# Patient Record
Sex: FEMALE | Race: BLACK OR AFRICAN AMERICAN | Employment: PART TIME
[De-identification: names, ages, dates, MRNs, and addresses within clinical notes are randomized per-mention and may not be internally consistent; named-entity substitution may affect disease eponyms.]

---

## 2017-05-27 ENCOUNTER — HEALTH MAINTENANCE LETTER (OUTPATIENT)
Age: 32
End: 2017-05-27

## 2017-07-19 ENCOUNTER — NURSE TRIAGE (OUTPATIENT)
Dept: NURSING | Facility: CLINIC | Age: 32
End: 2017-07-19

## 2017-07-19 NOTE — TELEPHONE ENCOUNTER
"  Reason for Disposition    Lightheadedness or dizziness (e.g., feels like passing out)    Additional Information    Negative: MODERATE-SEVERE abdominal pain (e.g., interferes with normal activities, awakens from sleep)    Negative: [1] SEVERE vaginal bleeding (i.e., soaking 2 pads / hour, large blood clots) AND [2] present 2 or more hours    Negative: [1] MODERATE vaginal bleeding (i.e., soaking 1 pad / hour; clots) AND [2] present > 6 hours    Negative: [1] MODERATE vaginal bleeding (i.e., soaking 1 pad / hour; clots) AND [2] pregnant > 12 weeks    Negative: Passed tissue (e.g., gray-white)    Negative: [1] Vomiting AND [2] contains red blood or black (\"coffee ground\") material  (Exception: few red streaks in vomit that only happened once)    Negative: Shoulder pain    Protocols used: PREGNANCY - ABDOMINAL PAIN LESS THAN 20 WEEKS EGA-ADULT-    "

## 2017-07-20 ENCOUNTER — APPOINTMENT (OUTPATIENT)
Dept: ULTRASOUND IMAGING | Facility: CLINIC | Age: 32
End: 2017-07-20
Attending: EMERGENCY MEDICINE
Payer: COMMERCIAL

## 2017-07-20 ENCOUNTER — HOSPITAL ENCOUNTER (EMERGENCY)
Facility: CLINIC | Age: 32
Discharge: HOME | End: 2017-07-20
Attending: EMERGENCY MEDICINE | Admitting: EMERGENCY MEDICINE
Payer: COMMERCIAL

## 2017-07-20 VITALS
BODY MASS INDEX: 26.72 KG/M2 | WEIGHT: 180.4 LBS | HEIGHT: 69 IN | TEMPERATURE: 98.2 F | DIASTOLIC BLOOD PRESSURE: 68 MMHG | OXYGEN SATURATION: 100 % | RESPIRATION RATE: 18 BRPM | SYSTOLIC BLOOD PRESSURE: 107 MMHG

## 2017-07-20 DIAGNOSIS — R42 LIGHTHEADEDNESS: ICD-10-CM

## 2017-07-20 DIAGNOSIS — E16.2 HYPOGLYCEMIA: ICD-10-CM

## 2017-07-20 LAB
ABO + RH BLD: NORMAL
ABO + RH BLD: NORMAL
ALBUMIN UR-MCNC: NEGATIVE MG/DL
ANION GAP SERPL CALCULATED.3IONS-SCNC: 9 MMOL/L (ref 3–14)
APPEARANCE UR: CLEAR
B-HCG SERPL-ACNC: ABNORMAL IU/L (ref 0–5)
BASOPHILS # BLD AUTO: 0 10E9/L (ref 0–0.2)
BASOPHILS NFR BLD AUTO: 0.1 %
BILIRUB UR QL STRIP: NEGATIVE
BUN SERPL-MCNC: 9 MG/DL (ref 7–30)
CALCIUM SERPL-MCNC: 8.5 MG/DL (ref 8.5–10.1)
CHLORIDE SERPL-SCNC: 103 MMOL/L (ref 94–109)
CO2 SERPL-SCNC: 25 MMOL/L (ref 20–32)
COLOR UR AUTO: YELLOW
CREAT SERPL-MCNC: 0.69 MG/DL (ref 0.52–1.04)
DIFFERENTIAL METHOD BLD: ABNORMAL
EOSINOPHIL # BLD AUTO: 0.1 10E9/L (ref 0–0.7)
EOSINOPHIL NFR BLD AUTO: 0.7 %
ERYTHROCYTE [DISTWIDTH] IN BLOOD BY AUTOMATED COUNT: 12.8 % (ref 10–15)
GFR SERPL CREATININE-BSD FRML MDRD: ABNORMAL ML/MIN/1.7M2
GLUCOSE BLDC GLUCOMTR-MCNC: 148 MG/DL (ref 70–99)
GLUCOSE SERPL-MCNC: 57 MG/DL (ref 70–99)
GLUCOSE UR STRIP-MCNC: NEGATIVE MG/DL
HCT VFR BLD AUTO: 40.4 % (ref 35–47)
HGB BLD-MCNC: 14.2 G/DL (ref 11.7–15.7)
HGB UR QL STRIP: NEGATIVE
IMM GRANULOCYTES # BLD: 0 10E9/L (ref 0–0.4)
IMM GRANULOCYTES NFR BLD: 0.3 %
INTERPRETATION ECG - MUSE: NORMAL
KETONES UR STRIP-MCNC: NEGATIVE MG/DL
LEUKOCYTE ESTERASE UR QL STRIP: NEGATIVE
LYMPHOCYTES # BLD AUTO: 1.5 10E9/L (ref 0.8–5.3)
LYMPHOCYTES NFR BLD AUTO: 20.7 %
MCH RBC QN AUTO: 33.3 PG (ref 26.5–33)
MCHC RBC AUTO-ENTMCNC: 35.1 G/DL (ref 31.5–36.5)
MCV RBC AUTO: 95 FL (ref 78–100)
MONOCYTES # BLD AUTO: 0.6 10E9/L (ref 0–1.3)
MONOCYTES NFR BLD AUTO: 8.1 %
NEUTROPHILS # BLD AUTO: 5.2 10E9/L (ref 1.6–8.3)
NEUTROPHILS NFR BLD AUTO: 70.1 %
NITRATE UR QL: NEGATIVE
NRBC # BLD AUTO: 0 10*3/UL
NRBC BLD AUTO-RTO: 0 /100
PH UR STRIP: 6 PH (ref 5–7)
PLATELET # BLD AUTO: 294 10E9/L (ref 150–450)
POTASSIUM SERPL-SCNC: 3.5 MMOL/L (ref 3.4–5.3)
RBC # BLD AUTO: 4.27 10E12/L (ref 3.8–5.2)
SODIUM SERPL-SCNC: 137 MMOL/L (ref 133–144)
SP GR UR STRIP: 1.02 (ref 1–1.03)
SPECIMEN EXP DATE BLD: NORMAL
URN SPEC COLLECT METH UR: NORMAL
UROBILINOGEN UR STRIP-ACNC: 0.2 EU/DL (ref 0.2–1)
WBC # BLD AUTO: 7.4 10E9/L (ref 4–11)

## 2017-07-20 PROCEDURE — 96361 HYDRATE IV INFUSION ADD-ON: CPT

## 2017-07-20 PROCEDURE — 96365 THER/PROPH/DIAG IV INF INIT: CPT

## 2017-07-20 PROCEDURE — 25000128 H RX IP 250 OP 636: Performed by: EMERGENCY MEDICINE

## 2017-07-20 PROCEDURE — 81003 URINALYSIS AUTO W/O SCOPE: CPT | Performed by: EMERGENCY MEDICINE

## 2017-07-20 PROCEDURE — 80048 BASIC METABOLIC PNL TOTAL CA: CPT | Performed by: EMERGENCY MEDICINE

## 2017-07-20 PROCEDURE — 99285 EMERGENCY DEPT VISIT HI MDM: CPT | Mod: 25

## 2017-07-20 PROCEDURE — 93005 ELECTROCARDIOGRAM TRACING: CPT

## 2017-07-20 PROCEDURE — 76801 OB US < 14 WKS SINGLE FETUS: CPT

## 2017-07-20 PROCEDURE — 85025 COMPLETE CBC W/AUTO DIFF WBC: CPT | Performed by: EMERGENCY MEDICINE

## 2017-07-20 PROCEDURE — 84702 CHORIONIC GONADOTROPIN TEST: CPT | Performed by: EMERGENCY MEDICINE

## 2017-07-20 PROCEDURE — 00000146 ZZHCL STATISTIC GLUCOSE BY METER IP

## 2017-07-20 PROCEDURE — 86901 BLOOD TYPING SEROLOGIC RH(D): CPT | Performed by: EMERGENCY MEDICINE

## 2017-07-20 RX ORDER — DEXTROSE, SODIUM CHLORIDE, SODIUM LACTATE, POTASSIUM CHLORIDE, AND CALCIUM CHLORIDE 5; .6; .31; .03; .02 G/100ML; G/100ML; G/100ML; G/100ML; G/100ML
500 INJECTION, SOLUTION INTRAVENOUS ONCE
Status: COMPLETED | OUTPATIENT
Start: 2017-07-20 | End: 2017-07-20

## 2017-07-20 RX ORDER — SODIUM CHLORIDE 9 MG/ML
1000 INJECTION, SOLUTION INTRAVENOUS CONTINUOUS
Status: DISCONTINUED | OUTPATIENT
Start: 2017-07-20 | End: 2017-07-20 | Stop reason: HOSPADM

## 2017-07-20 RX ADMIN — SODIUM CHLORIDE 1000 ML: 9 INJECTION, SOLUTION INTRAVENOUS at 17:32

## 2017-07-20 RX ADMIN — SODIUM CHLORIDE, SODIUM LACTATE, POTASSIUM CHLORIDE, CALCIUM CHLORIDE AND DEXTROSE MONOHYDRATE 500 ML: 5; 600; 310; 30; 20 INJECTION, SOLUTION INTRAVENOUS at 18:44

## 2017-07-20 ASSESSMENT — ENCOUNTER SYMPTOMS
ABDOMINAL PAIN: 0
ROS GI COMMENTS: CRAMPING
LIGHT-HEADEDNESS: 1

## 2017-07-20 NOTE — ED AVS SNAPSHOT
Emergency Department    [address] [facility] [address]    Phone:  [phone]    Fax:  [phone]                                       Marta Asencio   MRN: [id number]    Department:   Emergency Department   Date of Visit:  7/20/2017           Patient Information     Date Of Birth          1985        Your diagnoses for this visit were:     Hypoglycemia     Lightheadedness        You were seen by Charlie Carvalho MD.      Follow-up Information     Follow up with Carmen Ingram MD In 1 week.    Specialty:  OB/Gyn    Contact information:    [facility]  [address]  [facility] [address] [phone]          Follow up with  Emergency Department.    Specialty:  EMERGENCY MEDICINE    Why:  As needed, If symptoms worsen    Contact information:    [address] [facility] [address] [phone]        Discharge Instructions         Hypoglycemic Reaction (Nondiabetic)  You have had an episode of low blood sugar (hypoglycemia). A single episode of hypoglycemia does not mean that you have diabetes or that this problem will recur. There are many causes for low blood sugar. These include eating highly refined starchy foods (carbohydrates), drinking too much alcohol, intense exercise, fatigue, stress, poor diet, pregnancy, and certain illnesses.  Your blood sugar level may also be affected by tobacco, caffeine, and certain medicines, including:    Aspirin    Haloperidol    Propoxyphene    Chlorpromazine    Propranolol    Disopyramide    ACE inhibitors  A class of medicine called beta-blockers is used for high blood pressure, rapid heart rates, and other conditions. Beta-blockers may prevent the early symptoms of low blood sugar. If you are taking a beta-blocker, you might not realize that your blood sugar is getting low. If you are taking a beta-blocker and are prone to low blood sugar, talk to your healthcare provider about switching to a  different class of medicine. The beta-blocker class includes:    Propranolol    Atenolol    Metoprolol    Nadolol    Labetalol    Carvedilol  Home care    Rest today and resume a normal diet. Eliminate any of the above known causes where possible.    The proper diet for true hypoglycemia (diagnosed with a glucose tolerance test) is high protein (20% of calories), low carbohydrate (50% of calories), and moderate fat (30% of calories) in 6 small meals per day.    If this is your first episode of low blood sugar, or if you have not yet been tested with a glucose tolerance test, eat small frequent meals rather than fewer large meals. Limit starchy foods during the next 1 to 2 days to avoid a recurrence of low blood sugar.    It is important to learn the warning signals your body gives as your blood sugar starts to drop. See the symptoms listed below.  If symptoms of hypoglycemia return    Keep a source of fast-acting sugar with you. At the first sign of low blood sugar, eat or drink 15 to 20 grams of fast-acting sugar. Examples include:    3 to 4 glucose tablets (found at most drugstores)    4 ounces of regular soda    4 ounces of fruit juice    2 tablespoons of raisins    1 tablespoon of honey    If consuming fast-acting sugar does not improve your symptoms within 20 minutes, go to an emergency room.    If you have severe hypoglycemic spells, wear a medical alert bracelet or carry a card in your wallet describing this condition. If you have a severe hypoglycemic reaction and are unable to give this information, it will help medical staff provide proper care.  Follow-up care  Follow up with your healthcare provider, or as advised.  When to seek medical advice  Call your healthcare provider right away if any of these symptoms of low blood sugar occur.    Fatigue or headache    Trembling or excess sweating    Hunger    Feeling anxious or restless    Vision changes    Irritability    Sleepiness    Dizziness  Call  911  Contact emergency services right away if any of these occur:    Drowsiness    Weakness    Confusion    Loss of consciousness  Date Last Reviewed: 8/24/2015 2000-2017 [employer]. [address] [place] [address], [place] [address]. All rights reserved. This information is not intended as a substitute for professional medical care. Always follow your healthcare professional's instructions.        Near-Fainting: Uncertain Cause  Fainting (syncope) is a temporary loss of consciousness (passing out). This happens when blood flow to the brain is reduced. Near-fainting (near-syncope) is like fainting, but you do not fully pass out. Instead, you feel like you are going to pass out, but do not actually lose consciousness.  Signs and symptoms  The following are symptoms of near-fainting:    Feeling lightheaded or like you are going to faint    Weak pulse    Nausea    Sweating    Blurred vision or feeling like your vision is fading    Palpitations    Chest pain    Hard time breathing    Feeling cool and clammy  Causes  This happens when your blood pressure suddenly drops, and not enough blood flows to your brain.  Common minor causes include:    Sudden emotional stress such as fear, pain, panic, or the sight of blood    Straining or overexertion, such as straining while using the toilet, coughing, or sneezing    Standing up too quickly, or standing up for too long a time  More serious causes include:    Very slow, fast, or irregular heart rate (arrhythmia)    Dehydration    Significant blood loss    Medicines, or a recent change in medicines. Medicines that can cause fainting include blood pressure or heart medicines.    Heart attack    Heart valve problems  Remember, even minor causes can become serious if you fall and injure yourself, or are driving. You may need more tests. It is very important that you follow up with your doctor as advised.  Home care  The following guidelines will help you care for yourself  at home:    Rest today. Resume your normal activities as soon as you are feeling back to normal.    If you become lightheaded or dizzy, lie down right away or sit with your head between your knees.    Drink plenty of fluids and don't skip meals.  Because the exact cause of your near fainting spell is not known, another spell could occur without warning. To stay safe, do not drive a car or use dangerous equipment. Do not take a bath alone. Use a shower instead. Do not swim alone. You can resume these activities when your healthcare provider says that you are no longer in danger of having a near-fainting spell.  Follow-up care  Follow up with your healthcare provider, or as advised.  Call 911  Call 911 if any of the following occur:    Another near-fainting or full fainting spell occurs, and it is not explained by the common causes listed above    Chest, arm, neck, jaw, back or abdominal pain    Shortness of breath    Weakness, tingling, or numbness in one side of the face, or in one arm or leg    Slurred speech, confusion, trouble walking or seeing    Seizure  When to seek medical advice  Call your healthcare provider right away if any of these occur:    Changes in your medicines    Occasional mild lightheadedness, especially when standing up too quickly or straining  Date Last Reviewed: 10/1/2016    5935-3594 [employer]. [address] [place] [address], [place] [address]. All rights reserved. This information is not intended as a substitute for professional medical care. Always follow your healthcare professional's instructions.          Future Appointments        Provider Department Dept Phone Center    7/24/2017 8:15 AM [facility] Nurse [facility] [phone] [facility]      24 Hour Appointment Hotline       To make an appointment at any [facility], call [phone] ([phone]). If you don't have a family doctor or clinic, we will help you find one. [facility]  clinics are conveniently located to serve the needs of you and your family.             Review of your medicines      Our records show that you are taking the medicines listed below. If these are incorrect, please call your family doctor or clinic.        Dose / Directions Last dose taken    DEPO-PROVERA IM        Refills:  0        ibuprofen 600 MG tablet   Commonly known as:  ADVIL/MOTRIN   Dose:  600 mg   Quantity:  40 tablet        Take 1 tablet (600 mg) by mouth every 6 hours as needed for moderate pain   Refills:  0        NORDETTE (28) 0.15-30 MG-MCG per tablet   Quantity:  3 month   Generic drug:  levonorgestrel-ethinyl estradiol        1 TABLET DAILY   Refills:  3                Procedures and tests performed during your visit     *UA reflex to Microscopic (ED Lab POCT Only 3-11)    Basic metabolic panel    CBC with platelets differential    EKG 12-lead, tracing only    HCG quantitative pregnancy (blood)    Orthostatic blood pressure and pulse    Peripheral IV catheter    Rh type    US OB < 14 Weeks Single      Orders Needing Specimen Collection     None      Pending Results     No orders found from 7/18/2017 to 7/21/2017.            Pending Culture Results     No orders found from 7/18/2017 to 7/21/2017.            Pending Results Instructions     If you had any lab results that were not finalized at the time of your Discharge, you can call the ED Lab Result RN at [phone]. You will be contacted by this team for any positive Lab results or changes in treatment. The nurses are available 7 days a week from 10A to 6:30P.  You can leave a message 24 hours per day and they will return your call.        Test Results From Your Hospital Stay        7/20/2017  5:49 PM      Component Results     Component Value Ref Range & Units Status    WBC 7.4 4.0 - 11.0 10e9/L Final    RBC Count 4.27 3.8 - 5.2 10e12/L Final    Hemoglobin 14.2 11.7 - 15.7 g/dL Final    Hematocrit 40.4 35.0 - 47.0 % Final    MCV 95 78 - 100 fl  Final    MCH 33.3 (H) 26.5 - 33.0 pg Final    MCHC 35.1 31.5 - 36.5 g/dL Final    RDW 12.8 10.0 - 15.0 % Final    Platelet Count 294 150 - 450 10e9/L Final    Diff Method Automated Method  Final    % Neutrophils 70.1 % Final    % Lymphocytes 20.7 % Final    % Monocytes 8.1 % Final    % Eosinophils 0.7 % Final    % Basophils 0.1 % Final    % Immature Granulocytes 0.3 % Final    Nucleated RBCs 0 0 /100 Final    Absolute Neutrophil 5.2 1.6 - 8.3 10e9/L Final    Absolute Lymphocytes 1.5 0.8 - 5.3 10e9/L Final    Absolute Monocytes 0.6 0.0 - 1.3 10e9/L Final    Absolute Eosinophils 0.1 0.0 - 0.7 10e9/L Final    Absolute Basophils 0.0 0.0 - 0.2 10e9/L Final    Abs Immature Granulocytes 0.0 0 - 0.4 10e9/L Final    Absolute Nucleated RBC 0.0  Final         7/20/2017  6:08 PM      Component Results     Component Value Ref Range & Units Status    Sodium 137 133 - 144 mmol/L Final    Potassium 3.5 3.4 - 5.3 mmol/L Final    Chloride 103 94 - 109 mmol/L Final    Carbon Dioxide 25 20 - 32 mmol/L Final    Anion Gap 9 3 - 14 mmol/L Final    Glucose 57 (L) 70 - 99 mg/dL Final    Urea Nitrogen 9 7 - 30 mg/dL Final    Creatinine 0.69 0.52 - 1.04 mg/dL Final    GFR Estimate >90  Non  GFR Calc   >60 mL/min/1.7m2 Final    GFR Estimate If Black >90   GFR Calc   >60 mL/min/1.7m2 Final    Calcium 8.5 8.5 - 10.1 mg/dL Final         7/20/2017  7:02 PM      Component Results     Component    ABO    AB    RH(D)     Pos    Specimen Expires    07/23/2017 7/20/2017  7:02 PM      Component Results     Component Value Ref Range & Units Status    HCG Quantitative Serum 122759 (H) 0 - 5 IU/L Final               7/20/2017  6:44 PM      Component Results     Component Value Ref Range & Units Status    Color Urine Yellow  Final    Appearance Urine Clear  Final    Glucose Urine Negative NEG mg/dL Final    Bilirubin Urine Negative NEG Final    Ketones Urine Negative NEG mg/dL Final    Specific Gravity Urine 1.025  1.003 - 1.035 Final    Blood Urine Negative NEG Final    pH Urine 6.0 5.0 - 7.0 pH Final    Protein Albumin Urine Negative NEG mg/dL Final    Urobilinogen Urine 0.2 0.2 - 1.0 EU/dL Final    Nitrite Urine Negative NEG Final    Leukocyte Esterase Urine Negative NEG Final    Source Midstream Urine  Final         7/20/2017  6:18 PM      Narrative     US OB < 14 WEEKS SINGLE  7/20/2017 6:05 PM    HISTORY: Early pregnancy, cramping    TECHNIQUE: Transabdominal imaging is performed.    COMPARISON: None.    FINDINGS:      Estimated gestational age by current ultrasound measurement: 9 weeks 4  days.  Estimated date of delivery based on this ultrasound: 2/18/2018.  Crown-rump length: 2.7 cm.   Embryonic cardiac activity: 178 bpm.   Yolk sac: Present.  Subchorionic hemorrhage: None.    Right ovary: Corpus luteal cyst.  Left ovary: Unremarkable.  Adnexal mass: None.  Free pelvic fluid: None.        Impression     IMPRESSION: Live intrauterine pregnancy measures 9 weeks 4 days with  an estimated date of delivery 2/18/2018.    DINOISIO HAWKINS MD                Clinical Quality Measure: Blood Pressure Screening     Your blood pressure was checked while you were in the emergency department today. The last reading we obtained was  BP: 107/68 . Please read the guidelines below about what these numbers mean and what you should do about them.  If your systolic blood pressure (the top number) is less than 120 and your diastolic blood pressure (the bottom number) is less than 80, then your blood pressure is normal. There is nothing more that you need to do about it.  If your systolic blood pressure (the top number) is 120-139 or your diastolic blood pressure (the bottom number) is 80-89, your blood pressure may be higher than it should be. You should have your blood pressure rechecked within a year by a primary care provider.  If your systolic blood pressure (the top number) is 140 or greater or your diastolic blood pressure (the bottom  "number) is 90 or greater, you may have high blood pressure. High blood pressure is treatable, but if left untreated over time it can put you at risk for heart attack, stroke, or kidney failure. You should have your blood pressure rechecked by a primary care provider within the next 4 weeks.  If your provider in the emergency department today gave you specific instructions to follow-up with your doctor or provider even sooner than that, you should follow that instruction and not wait for up to 4 weeks for your follow-up visit.        Thank you for choosing [facility]       Thank you for choosing [facility] for your care. Our goal is always to provide you with excellent care. Hearing back from our patients is one way we can continue to improve our services. Please take a few minutes to complete the written survey that you may receive in the mail after you visit with us. Thank you!        [employer] Information     [employer] lets you send messages to your doctor, view your test results, renew your prescriptions, schedule appointments and more. To sign up, go to [email or web address] . Click on \"Log in\" on the left side of the screen, which will take you to the Welcome page. Then click on \"Sign up Now\" on the right side of the page.     You will be asked to enter the access code listed below, as well as some personal information. Please follow the directions to create your username and password.     Your access code is: [email or web address]  Expires: 10/18/2017  7:54 PM     Your access code will  in 90 days. If you need help or a new code, please call your [facility] clinic or [phone].        Care EveryWhere ID     This is your Care EveryWhere ID. This could be used by other organizations to access your [facility] medical records  [id number]        Equal Access to Services     BECCA SHEIKH : xiomara Joseph qaybta kaalmada adeegyada, waxay idiin hayaan adeeg kharash la'aan ah. So earl " [phone].    ATENCIÓN: Si habla español, tiene a herman disposición servicios gratuitos de asistencia lingüística. Llame al [phone].    We comply with applicable federal civil rights laws and [place] laws. We do not discriminate on the basis of race, color, national origin, age, disability sex, sexual orientation or gender identity.            After Visit Summary       This is your record. Keep this with you and show to your community pharmacist(s) and doctor(s) at your next visit.

## 2017-07-20 NOTE — ED AVS SNAPSHOT
Emergency Department    [address] [place] [address]    Phone:  [phone]    Fax:  [phone]                                       Marta Asencio   MRN: [id number]    Department:   Emergency Department   Date of Visit:  7/20/2017           After Visit Summary Signature Page     I have received my discharge instructions, and my questions have been answered. I have discussed any challenges I see with this plan with the nurse or doctor.    ..........................................................................................................................................  Patient/Patient Representative Signature      ..........................................................................................................................................  Patient Representative Print Name and Relationship to Patient    ..................................................               ................................................  Date                                            Time    ..........................................................................................................................................  Reviewed by Signature/Title    ...................................................              ..............................................  Date                                                            Time

## 2017-07-20 NOTE — ED PROVIDER NOTES
History     Chief Complaint:  Dizziness       HPI   Marta Asencio is a [id number] 9 weeks pregnant 31 year old female who presents to the emergency department today for evaluation of dizziness. The patient reports lightheadedness that began yesterday morning. When the patient stood up, she would need to immediately sit down. She states that she may have been working too much or not hydrating. Today, the lightheadedness felt like it was progressing. The patient reports fatigue and abdominal cramping earlier today. The patient denies any chest pain. The patient denies any recent travel, history of blood clots, or family history of blood clots. The patient quit smoking when she found out that she was pregnant. The patient denies any abdominal pain or bleeding.     PE/DVT Risk Factors:   Hx of PE/DVT:                        Negative  Hx of clotting disorder:            Negative  Hx of cancer:                          Negative  Tobacco use:                          Positive  Hormone therapy:                   Negative  Pregnancy:                              Positive  Prolonged immobilization:       Negative  Recent surgery:                       Negative  Recent travel:                          Negative  Familial Hx of PE/DVT:           Negative     Allergies:  No Known Drug Allergies      Medications:    MedroxyPROGESTERone Acetate (DEPO-PROVERA IM)  JIGAR (28) 0.15-30 MG-MCG OR TABS    Past Medical History:    History reviewed. No pertinent past medical history.     Past Surgical History:    History reviewed. No pertinent past surgical history.     Family History:    Hepatitis  Alcohol/Drug  Asthma  Depression     Social History:  The patient was accompanied to the ED by her daughters.  Smoking Status: Current Every Day Smoker  Smokeless Tobacco: Not on file  Alcohol Use:  Yes   Marital Status:  Single      Review of Systems   Respiratory: Negative for cough.    Cardiovascular: Negative for chest pain.  "  Gastrointestinal: Negative for abdominal pain.        Cramping   Genitourinary: Negative for vaginal bleeding.   Neurological: Positive for light-headedness.   All other systems reviewed and are negative.    Physical Exam   First Vitals:  BP: 114/61  Heart Rate: 78  Temp: 98.2  F (36.8  C)  Resp: 14  Height: 174 cm (5' 8.5\")  Weight: 81.8 kg (180 lb 6.4 oz)  SpO2: 99 %    Physical Exam  General: Alert, interactive  Head:  Scalp is atraumatic  Eyes:  The pupils are equal, round, and reactive to light    EOM's intact    No scleral icterus  ENT:      Nose:  The external nose is normal  Ears:  External ears are normal  Mouth/Throat: The oropharynx is normal    Mucus membranes are moist       Neck:  Normal range of motion.      There is no rigidity.    Trachea is in the midline         CV:  Regular rate and rhythm    No murmur   Resp:  Breath sounds are clear bilaterally    Non-labored, no retractions or accessory muscle use      GI:  Abdomen is soft, no distension, no tenderness.       MS:  Normal strength in all 4 extremities  Skin:  Warm and dry, No rash or lesions noted.  Neuro: Strength 5/5 x4.  Sensation intact  In all 4 extremities.        Psych:  Awake. Alert.  Normal affect.      Appropriate interactions.    Emergency Department Course     ECG:  Indication: Dizziness  Completed at 1737.  Read at 1742.   Sinus rhythm  Normal ECG  Rate 71 bpm. MI interval 158. QRS duration 84. QT/QTc 392/425. P-R-T axes 72 74 53.     Imaging:  Radiology findings were communicated with the patient who voiced understanding of the findings.    US OB <14 Weeks W Transvaginal  IMPRESSION: Live intrauterine pregnancy measures 9 weeks 4 days with  an estimated date of delivery 2/18/2018.  Report per radiology     Laboratory:  Laboratory findings were communicated with the patient who voiced understanding of the findings.  CBC: AWNL. (WBC 7.4, HGB 14.2, )   HCG Quantitative: 465666 (H)  Rh type: AB Pos  BMP: Glucose 57 (L) o/w " WNL (Creatinine 0.69)   UA with Microscopic: clear yellow urine AWNL   Glucose by meter: 148 (H)    Interventions:  1732: NS 1,000mL IV   1844: D5LR 500mL IV     Emergency Department Course:  Nursing notes and vitals reviewed.  1725: I performed an exam of the patient as documented above.   The patient was sent for a US OB <14 Weeks W Transvaginal while in the emergency department, results above.   IV was inserted and blood was drawn for laboratory testing, results above.   1840: the patient was rechecked and updated. The patient states that she is feeling better.  I discussed the treatment plan with the patient. They expressed understanding of this plan and consented to discharge. They will be discharged home with instructions for care and follow up. In addition, the patient will return to the emergency department if their symptoms persist, worsen, if new symptoms arise or if there is any concern.  All questions were answered.   I personally reviewed the laboratory and imaging results with the Patient and answered all related questions prior to discharge.     Impression & Plan      Medical Decision Making:  Marta Asencio is a 31 year old female who was seen and evaluated. She presents with lightheadedness in early pregnancy. The above workup was undertaken demonstrating a hypoglycemia but was otherwise unremarkable. She received D5LR and a meal and this resolved. I considered pulmonary embolism however she has no tachycardia, hypoxia, tachypnea. I believe this is unlikely and I don't believe that she needs any further workup. There are no signs of acute electrolyte abnormality, signs of ectopic pregnancy or anemia. She was feeling improved after the medications above. I recommended increasing her fluids to stay hydrated, eating a well balanced diet and return if new symptoms develop. The patient was in agreement with this plan and subsequently discharged to home.    Diagnosis:    ICD-10-CM    1. Hypoglycemia  E16.2 Glucose by meter     Glucose by meter   2. Lightheadedness R42      Disposition:  Discharged to home      Scribe Disclosure:  I, Maribel Gunn, am serving as a scribe at 5:14 PM on 7/20/2017 to document services personally performed by Charlie Carvalho MD based on my observations and the provider's statements to me.   7/20/2017    EMERGENCY DEPARTMENT       Charlie Carvalho MD  07/21/17 2211

## 2017-07-20 NOTE — LETTER
EMERGENCY DEPARTMENT  [address] [facility] [id number]  [phone]    2017    Marta Asencio  [address]  [facility] [address]  [phone] (home)     : 1985      To Whom it may concern:    Marta Asencio was seen in our Emergency Department today, 2017.  I expect her condition to improve over the next 2 days.  She may return to work/school when improved.    Sincerely,        Charlie Doran Trigger

## 2017-07-21 ASSESSMENT — ENCOUNTER SYMPTOMS: COUGH: 0

## 2017-07-21 NOTE — DISCHARGE INSTRUCTIONS
Hypoglycemic Reaction (Nondiabetic)  You have had an episode of low blood sugar (hypoglycemia). A single episode of hypoglycemia does not mean that you have diabetes or that this problem will recur. There are many causes for low blood sugar. These include eating highly refined starchy foods (carbohydrates), drinking too much alcohol, intense exercise, fatigue, stress, poor diet, pregnancy, and certain illnesses.  Your blood sugar level may also be affected by tobacco, caffeine, and certain medicines, including:    Aspirin    Haloperidol    Propoxyphene    Chlorpromazine    Propranolol    Disopyramide    ACE inhibitors  A class of medicine called beta-blockers is used for high blood pressure, rapid heart rates, and other conditions. Beta-blockers may prevent the early symptoms of low blood sugar. If you are taking a beta-blocker, you might not realize that your blood sugar is getting low. If you are taking a beta-blocker and are prone to low blood sugar, talk to your healthcare provider about switching to a different class of medicine. The beta-blocker class includes:    Propranolol    Atenolol    Metoprolol    Nadolol    Labetalol    Carvedilol  Home care    Rest today and resume a normal diet. Eliminate any of the above known causes where possible.    The proper diet for true hypoglycemia (diagnosed with a glucose tolerance test) is high protein (20% of calories), low carbohydrate (50% of calories), and moderate fat (30% of calories) in 6 small meals per day.    If this is your first episode of low blood sugar, or if you have not yet been tested with a glucose tolerance test, eat small frequent meals rather than fewer large meals. Limit starchy foods during the next 1 to 2 days to avoid a recurrence of low blood sugar.    It is important to learn the warning signals your body gives as your blood sugar starts to drop. See the symptoms listed below.  If symptoms of hypoglycemia return    Keep a source of  fast-acting sugar with you. At the first sign of low blood sugar, eat or drink 15 to 20 grams of fast-acting sugar. Examples include:    3 to 4 glucose tablets (found at most drugstores)    4 ounces of regular soda    4 ounces of fruit juice    2 tablespoons of raisins    1 tablespoon of honey    If consuming fast-acting sugar does not improve your symptoms within 20 minutes, go to an emergency room.    If you have severe hypoglycemic spells, wear a medical alert bracelet or carry a card in your wallet describing this condition. If you have a severe hypoglycemic reaction and are unable to give this information, it will help medical staff provide proper care.  Follow-up care  Follow up with your healthcare provider, or as advised.  When to seek medical advice  Call your healthcare provider right away if any of these symptoms of low blood sugar occur.    Fatigue or headache    Trembling or excess sweating    Hunger    Feeling anxious or restless    Vision changes    Irritability    Sleepiness    Dizziness  Call 911  Contact emergency services right away if any of these occur:    Drowsiness    Weakness    Confusion    Loss of consciousness  Date Last Reviewed: 8/24/2015 2000-2017 [employer]. [address] [place] [address]. All rights reserved. This information is not intended as a substitute for professional medical care. Always follow your healthcare professional's instructions.        Near-Fainting: Uncertain Cause  Fainting (syncope) is a temporary loss of consciousness (passing out). This happens when blood flow to the brain is reduced. Near-fainting (near-syncope) is like fainting, but you do not fully pass out. Instead, you feel like you are going to pass out, but do not actually lose consciousness.  Signs and symptoms  The following are symptoms of near-fainting:    Feeling lightheaded or like you are going to faint    Weak pulse    Nausea    Sweating    Blurred vision or feeling  like your vision is fading    Palpitations    Chest pain    Hard time breathing    Feeling cool and clammy  Causes  This happens when your blood pressure suddenly drops, and not enough blood flows to your brain.  Common minor causes include:    Sudden emotional stress such as fear, pain, panic, or the sight of blood    Straining or overexertion, such as straining while using the toilet, coughing, or sneezing    Standing up too quickly, or standing up for too long a time  More serious causes include:    Very slow, fast, or irregular heart rate (arrhythmia)    Dehydration    Significant blood loss    Medicines, or a recent change in medicines. Medicines that can cause fainting include blood pressure or heart medicines.    Heart attack    Heart valve problems  Remember, even minor causes can become serious if you fall and injure yourself, or are driving. You may need more tests. It is very important that you follow up with your doctor as advised.  Home care  The following guidelines will help you care for yourself at home:    Rest today. Resume your normal activities as soon as you are feeling back to normal.    If you become lightheaded or dizzy, lie down right away or sit with your head between your knees.    Drink plenty of fluids and don't skip meals.  Because the exact cause of your near fainting spell is not known, another spell could occur without warning. To stay safe, do not drive a car or use dangerous equipment. Do not take a bath alone. Use a shower instead. Do not swim alone. You can resume these activities when your healthcare provider says that you are no longer in danger of having a near-fainting spell.  Follow-up care  Follow up with your healthcare provider, or as advised.  Call 911  Call 911 if any of the following occur:    Another near-fainting or full fainting spell occurs, and it is not explained by the common causes listed above    Chest, arm, neck, jaw, back or abdominal pain    Shortness of  breath    Weakness, tingling, or numbness in one side of the face, or in one arm or leg    Slurred speech, confusion, trouble walking or seeing    Seizure  When to seek medical advice  Call your healthcare provider right away if any of these occur:    Changes in your medicines    Occasional mild lightheadedness, especially when standing up too quickly or straining  Date Last Reviewed: 10/1/2016    1820-6819 [employer]. [address] [place] [address], [place] [id number]. All rights reserved. This information is not intended as a substitute for professional medical care. Always follow your healthcare professional's instructions.

## 2017-07-24 ENCOUNTER — PRENATAL OFFICE VISIT (OUTPATIENT)
Dept: NURSING | Facility: CLINIC | Age: 32
End: 2017-07-24
Payer: COMMERCIAL

## 2017-07-24 VITALS
HEART RATE: 83 BPM | BODY MASS INDEX: 26.66 KG/M2 | HEIGHT: 69 IN | DIASTOLIC BLOOD PRESSURE: 68 MMHG | WEIGHT: 180 LBS | SYSTOLIC BLOOD PRESSURE: 102 MMHG | TEMPERATURE: 98.3 F

## 2017-07-24 DIAGNOSIS — Z34.90 SUPERVISION OF NORMAL PREGNANCY: Primary | ICD-10-CM

## 2017-07-24 DIAGNOSIS — O21.9 NAUSEA AND VOMITING DURING PREGNANCY: ICD-10-CM

## 2017-07-24 PROBLEM — Z23 NEED FOR TDAP VACCINATION: Status: ACTIVE | Noted: 2017-07-24

## 2017-07-24 LAB
ALBUMIN UR-MCNC: NEGATIVE MG/DL
APPEARANCE UR: CLEAR
BILIRUB UR QL STRIP: NEGATIVE
COLOR UR AUTO: YELLOW
ERYTHROCYTE [DISTWIDTH] IN BLOOD BY AUTOMATED COUNT: 12.7 % (ref 10–15)
GLUCOSE UR STRIP-MCNC: NEGATIVE MG/DL
HCT VFR BLD AUTO: 41 % (ref 35–47)
HGB BLD-MCNC: 13.7 G/DL (ref 11.7–15.7)
HGB UR QL STRIP: NEGATIVE
KETONES UR STRIP-MCNC: NEGATIVE MG/DL
LEUKOCYTE ESTERASE UR QL STRIP: NEGATIVE
MCH RBC QN AUTO: 32.4 PG (ref 26.5–33)
MCHC RBC AUTO-ENTMCNC: 33.4 G/DL (ref 31.5–36.5)
MCV RBC AUTO: 97 FL (ref 78–100)
NITRATE UR QL: POSITIVE
PH UR STRIP: 6.5 PH (ref 5–7)
PLATELET # BLD AUTO: 284 10E9/L (ref 150–450)
RBC # BLD AUTO: 4.23 10E12/L (ref 3.8–5.2)
SP GR UR STRIP: 1.01 (ref 1–1.03)
URN SPEC COLLECT METH UR: ABNORMAL
UROBILINOGEN UR STRIP-ACNC: 0.2 EU/DL (ref 0.2–1)
WBC # BLD AUTO: 6.4 10E9/L (ref 4–11)

## 2017-07-24 PROCEDURE — 87340 HEPATITIS B SURFACE AG IA: CPT | Performed by: OBSTETRICS & GYNECOLOGY

## 2017-07-24 PROCEDURE — 86780 TREPONEMA PALLIDUM: CPT | Performed by: OBSTETRICS & GYNECOLOGY

## 2017-07-24 PROCEDURE — 36415 COLL VENOUS BLD VENIPUNCTURE: CPT | Performed by: OBSTETRICS & GYNECOLOGY

## 2017-07-24 PROCEDURE — 99000 SPECIMEN HANDLING OFFICE-LAB: CPT | Performed by: OBSTETRICS & GYNECOLOGY

## 2017-07-24 PROCEDURE — 85027 COMPLETE CBC AUTOMATED: CPT | Performed by: OBSTETRICS & GYNECOLOGY

## 2017-07-24 PROCEDURE — 86850 RBC ANTIBODY SCREEN: CPT | Performed by: OBSTETRICS & GYNECOLOGY

## 2017-07-24 PROCEDURE — 86900 BLOOD TYPING SEROLOGIC ABO: CPT | Performed by: OBSTETRICS & GYNECOLOGY

## 2017-07-24 PROCEDURE — 81003 URINALYSIS AUTO W/O SCOPE: CPT | Performed by: OBSTETRICS & GYNECOLOGY

## 2017-07-24 PROCEDURE — 86901 BLOOD TYPING SEROLOGIC RH(D): CPT | Performed by: OBSTETRICS & GYNECOLOGY

## 2017-07-24 PROCEDURE — 87086 URINE CULTURE/COLONY COUNT: CPT | Performed by: OBSTETRICS & GYNECOLOGY

## 2017-07-24 PROCEDURE — 83021 HEMOGLOBIN CHROMOTOGRAPHY: CPT | Mod: 90 | Performed by: OBSTETRICS & GYNECOLOGY

## 2017-07-24 PROCEDURE — 86762 RUBELLA ANTIBODY: CPT | Performed by: OBSTETRICS & GYNECOLOGY

## 2017-07-24 PROCEDURE — 99207 ZZC NO CHARGE NURSE ONLY: CPT

## 2017-07-24 PROCEDURE — 87389 HIV-1 AG W/HIV-1&-2 AB AG IA: CPT | Performed by: OBSTETRICS & GYNECOLOGY

## 2017-07-24 RX ORDER — PYRIDOXINE HCL (VITAMIN B6) 25 MG
25 TABLET ORAL 3 TIMES DAILY
Qty: 100 TABLET | Refills: 1 | Status: SHIPPED | OUTPATIENT
Start: 2017-07-24

## 2017-07-24 RX ORDER — PRENATAL VIT/IRON FUM/FOLIC AC 27MG-0.8MG
1 TABLET ORAL DAILY
COMMUNITY
End: 2018-02-19

## 2017-07-24 RX ORDER — PRENATAL VIT/IRON FUM/FOLIC AC 27MG-0.8MG
1 TABLET ORAL DAILY
Qty: 100 TABLET | Refills: 3 | Status: SHIPPED | OUTPATIENT
Start: 2017-07-24 | End: 2018-02-19

## 2017-07-24 NOTE — LETTER
July 24, 2017        Marta Asencio  [address]  [place] [address]    To Whom it May Concern:    Marta Asencio was seen in our office on 7/24/2017 and was given the following instructions:  Patient may return to work on 7/24/17 after her appointmentt.    Sincerely,        Carmen Ingram MD

## 2017-07-24 NOTE — NURSING NOTE
"Chief Complaint   Patient presents with     Prenatal Care     new ob teaching and labs done       Initial /68  Pulse 83  Temp 98.3  F (36.8  C)  Ht 5' 8.5\" (1.74 m)  Wt 180 lb (81.6 kg)  LMP 05/20/2017  BMI 26.97 kg/m2 Estimated body mass index is 26.97 kg/(m^2) as calculated from the following:    Height as of this encounter: 5' 8.5\" (1.74 m).    Weight as of this encounter: 180 lb (81.6 kg).  Medication Reconciliation: complete    "

## 2017-07-24 NOTE — PROGRESS NOTES
Patient presents for new ob teaching and labs, sixth pregnancy.  Patient seen in the ED 7/20/17 for dizziness, fatigue, nausea, fluids given. Ordered first trimester screening/ [facility]. Handouts reviewed and given. Has appointment with Dr Ingram 8/08/17. Patient requester Rx for  Prenatal vitamins, B6 and Unisom      Caffeine intake/servings daily - 1 soda  Calcium intake/servings daily - 3  Exercise 0 times weekly - describe ; encouraged walking  Sunscreen used - No  Seatbelts used - Yes  Guns stored in the home - No  Self Breast Exam - No  Pap test up to date -  No  Eye exam up to date -  No  Dental exam up to date -  Yes  DEXA scan up to date -  No  Flex Sig/Colonoscopy up to date -  No  Mammography up to date -  No  Immunizations reviewed and up to date - Yes  Abuse: Current or Past (Physical, Sexual or Emotional) - No  Do you feel safe in your environment - Yes  Do you cope well with stress - Yes  Do you suffer from insomnia - No    Prenatal OB Questionnaire  Past Medical History  Diabetes   No  Hypertension   No  Heart Disease, mitral valve prolapse, or rheumatic fever?   No  An autoimmune disorder such as Lupus or Rheumatoid Arthritis?   No  Kidney Disease or Urinary Tract Infection?   No  Epilepsy, seizures or spells?   No  Migraine headaches?   No  A stroke or loss of function or sensation?   No  Any other neurological problems?   No  Have you ever been treated for depression?  No  Are you having problems with crying spells or loss of self-esteem?   No  Have you ever required psychiatric care?   No  Have you ever hepatitis, liver disease or jaundice?   No  Have you ever been treated for blood clots in your veins, deep venous thrombosis, inflammation in the veins, thrombosis, phlebitis, pulmonary embolism or varicosities?   No  Have you had excessive bleeding after surgery or dental work?   No  Do you bleed more than other women after a cut or scratch?   No  Do you have a history of anemia?   Yes  Have you  ever been treated for thyroid problems or taken thyroid medication?  No  Do you have any other endocrine problems?  No  Have you ever been in a major accident or suffered serious trauma?   No  Within the last year, has anyone hit slapped, kicked or otherwise hurt you?  No  In the last year, has anyone forced you to have sex when you didn't want to?  No  Have you ever had a blood transfusion?   No  Would you refuse a blood transfusion if a doctor judged it to be medically necessary?   No  If you answered yes, would you rather die than have a blood transfusion?   No  If you answered yes, is this for Advent reasons?   No  Does anyone in your home smoke?   No  Do you use tobacco products?  No  Do you drink beer, wine, hard liquor?  No  Do you use any of the following: marijuana, speed, cocaine, heroine, hallucinogens, or other drugs?  No  Is your blood type Rh negative?   No  Have you ever had abnormal antibodies in your blood?   No  Have you ever had asthma?   No  Have you ever had tuberculosis?   No  Do you have any allergies to drugs or over-the-counter medications?   No    Allergies as of 7/24/2017:    Allergies as of 07/24/2017 - [facility] as Reviewed 07/24/2017   Allergen Reaction Noted     No known drug allergies  05/05/2004       Do you have any breast problems?   No  Have you ever ?   No  Have you had any gynecological surgical procedures such as cervical conization, a LEEP procedure, laser treatment, cryosurgery of the cervix, or a dilation and curettage, etc?  Yes  Have you had any other surgical procedures?  No  Have you been hospitalized for a nonsurgical reason excluding normal delivery?   No  Have you ever had any anesthetic complications?   No  Have you ever had an abnormal pap smear?   No  Do you have a history of abnormalities of the uterus?   No  Did it take you more than one year to become pregnant?   No  Have you ever been evaluated or treated for infertility?   No  Is there a history of  medical problems in your family, which you feel might adversely affect your health or pregnancy?  No  Do you have any other problems we have not asked you about which you feel may be important to this pregnancy?  Yes LEEP    Symptoms since Last Menstrual Period  Do you have any of the following:    *abdominal pain  No  *blood in stool or urine  No  *chest pain  No  *shortness of breath  No  *coughing or vomiting up blood No  *heart racing or skipping beats  No  *nausea and vomiting  Yes  *pain with urination  No  *vaginal discharge or bleeding  No  Current medications are:  Current Outpatient Prescriptions   Medication Sig Dispense Refill     Prenatal Vit-Fe Fumarate-FA (PRENATAL MULTIVITAMIN  PLUS IRON) 27-0.8 MG TABS per tablet Take 1 tablet by mouth daily       Prenatal Vit-Fe Fumarate-FA (PRENATAL MULTIVITAMIN  PLUS IRON) 27-0.8 MG TABS per tablet Take 1 tablet by mouth daily 100 tablet 3     pyridOXINE (VITAMIN B-6) 25 MG tablet Take 1 tablet (25 mg) by mouth 3 times daily Take with unisom for nausea/vomiting 100 tablet 1     doxylamine (UNISOM) 25 MG TABS tablet Take 0.5 tablets (12.5 mg) by mouth 3 times daily Take with vitamin B6 for nausea/vomiting 28 each 1       Genetic Screening  At the time of birth, will you be 35 years old or older?  No  Has the patient, baby s father, or anyone in either family had:  Thalassemia (Italian, Greek, Mediterranean, or  background only) and an MCV result less than 80?  No  Neural tube defect such as meningomyelocele, spina bifida or anencephaly?  No  Congenital heart defect?  No  Down s syndrome?  No  Marcellus-Sach s disease (Jainism, Cajun, Serbian-Bamberg)?  No  Sickle cell disease or trait (Ashlee)?  FOB has the trait  Hemophilia or other inherited problems of blood coagulation? No  Muscular dystrophy?  No  Cystic Fibrosis?  No  Rocio s chorea?  No  Mental retardation/autism? No   If yes, was the person tested for fragile X?  No  Any other inherited genetic or  chromosomal disorder?  No  Maternal metabolic disorder (e.g. insulin-dependent diabetes, PKU)? No  A child with birth defects not listed above?  No  Recurrent pregnancy loss or a stillbirth?  No  Has the patient had any medications/street drugs/alcohol since her last menstrual period? No  Does the patient or baby s father have any other genetic risks?  No  Infection History  Do you object to being tested for Hepatitis B? No  Do you object to being tested for HIV? No  Do you feel that you are at high risk for coming in contact with the AIDS virus?  No  Have you ever been treated for tuberculosis?  No  Have you ever received the BCG vaccine for tuberculosis?  No  Have you ever had a positive skin test for tuberculosis? No  Do you live with someone who has tuberculosis?  No  Have you ever been exposed to tuberculosis?  No  Do you have genital herpes?  No  Does your partner have genital herpes?  No  Have you had a rash or viral illness since your last period?  No  Have you ever had Gonorrhea, Chlamydia, Syphilis, venereal warts, trichomoniasis, pelvic inflammatory disease or any other sexually transmitted disease?  HPV  Do you know if you are a genital group B streptococcus carrier? No  You have not had chicken pox/varicella  Yes  Have you been vaccinated against chicken pox?  Yes  Have you had any other infectious disease? No        Early ultrasound screening tool:    Does patient have irregular periods?  No  Did patient use hormonal birth control in the three months prior to positive urine pregnancy test? No  Is the patient breastfeeding?  No  Is the patient 10 weeks or greater at time of education visit?  No

## 2017-07-24 NOTE — MR AVS SNAPSHOT
After Visit Summary   7/24/2017    Marta Asencio    MRN: [id number]           Patient Information     Date Of Birth          1985        Visit Information        Provider Department      7/24/2017 8:15 AM RD OB NURSE EDUCATION [facility] [place]        Today's Diagnoses     Supervision of normal pregnancy    -  1    Nausea and vomiting during pregnancy           Follow-ups after your visit        Additional Services     MAT FETAL MED CTR REFERRAL-PREGNANCY       >> Patient may proceed with recommendations for further testing as directed by the Maternal Fetal Medicine Specialist >>    >> If requesting Fetal Echo: MFM will determine appropriate location for exam due to indication.    >> If requesting Lung Maturity Amnio:  If results indicate fetal lung maturity, induction or C/S is recommended within 36 hours.  Please schedule accordingly.     Dear Patient:   Please be aware that coverage of these services is subject to the terms and limitations of your health insurance plan.  Call member services at your health plan with any benefit or coverage questions.      Please bring the following to your appointment:    >>  Any x-rays, CTs or MRIs which have been performed.  Contact the facility where they were done to arrange for  prior to your scheduled appointment.  Any new CT, MRI or other procedures ordered by your specialist must be performed at a [facility] facility or coordinated by your clinic's referral office.  >>  List of current medications   >>  This referral request   >>  Any documents/labs given to you for this referral                  Your next 10 appointments already scheduled     Aug 08, 2017  8:00 AM CDT   New Prenatal with Carmen Ingram MD   [facility] [place] ([facility] [place])    [address] [place] [address] [phone]              Who to contact     If you have questions or need follow up information about  "today's clinic visit or your schedule please contact [facility] directly at [phone].  Normal or non-critical lab and imaging results will be communicated to you by MyChart, letter or phone within 4 business days after the clinic has received the results. If you do not hear from us within 7 days, please contact the clinic through MyChart or phone. If you have a critical or abnormal lab result, we will notify you by phone as soon as possible.  Submit refill requests through Enersave or call your pharmacy and they will forward the refill request to us. Please allow 3 business days for your refill to be completed.          Additional Information About Your Visit        Care EveryWhere ID     This is your Care EveryWhere ID. This could be used by other organizations to access your [place] medical records  [id number]        Your Vitals Were     Pulse Temperature Height Last Period BMI (Body Mass Index)       83 98.3  F (36.8  C) 5' 8.5\" (1.74 m) 05/20/2017 26.97 kg/m2        Blood Pressure from Last 3 Encounters:   07/24/17 102/68   07/20/17 107/68   04/13/15 (!) 133/95    Weight from Last 3 Encounters:   07/24/17 180 lb (81.6 kg)   07/20/17 180 lb 6.4 oz (81.8 kg)   04/13/15 145 lb (65.8 kg)              We Performed the Following     ABO/RH Type and Screen     Anti Treponema     CBC with Platelets     Hepatitis B surface antigen     HGB Eval Reflex to ELP or RBC Solubility     HIV Antigen Antibody Combo     MAT FETAL MED CTR REFERRAL-PREGNANCY     Rubella Antibody IgG Quantitative     UA without Microscopic     Urine Culture Aerobic Bacterial          Today's Medication Changes          These changes are accurate as of: 7/24/17  9:15 AM.  If you have any questions, ask your nurse or doctor.               Start taking these medicines.        Dose/Directions    doxylamine 25 MG Tabs tablet   Commonly known as:  UNISOM   Used for:  Supervision of normal pregnancy, Nausea and vomiting during pregnancy "        Dose:  12.5 mg   Take 0.5 tablets (12.5 mg) by mouth 3 times daily Take with vitamin B6 for nausea/vomiting   Quantity:  28 each   Refills:  1       pyridOXINE 25 MG tablet   Commonly known as:  vitamin B-6   Used for:  Supervision of normal pregnancy, Nausea and vomiting during pregnancy        Dose:  25 mg   Take 1 tablet (25 mg) by mouth 3 times daily Take with unisom for nausea/vomiting   Quantity:  100 tablet   Refills:  1         These medicines have changed or have updated prescriptions.        Dose/Directions    * prenatal multivitamin  plus iron 27-0.8 MG Tabs per tablet   Indication:  Pregnancy   This may have changed:  Another medication with the same name was added. Make sure you understand how and when to take each.        Dose:  1 tablet   Take 1 tablet by mouth daily   Refills:  0       * prenatal multivitamin  plus iron 27-0.8 MG Tabs per tablet   This may have changed:  You were already taking a medication with the same name, and this prescription was added. Make sure you understand how and when to take each.   Used for:  Supervision of normal pregnancy        Dose:  1 tablet   Take 1 tablet by mouth daily   Quantity:  100 tablet   Refills:  3       * Notice:  This list has 2 medication(s) that are the same as other medications prescribed for you. Read the directions carefully, and ask your doctor or other care provider to review them with you.         Where to get your medicines      These medications were sent to [facility] [id number] [place] [address] [address] AT [facility] [place]  [address], [facility] [address]     Phone:  [phone]     doxylamine 25 MG Tabs tablet    prenatal multivitamin  plus iron 27-0.8 MG Tabs per tablet    pyridOXINE 25 MG tablet                Primary Care Provider Office Phone # Fax #    Carmen Ingram [phone]       [facility] [address] [address]         Equal Access to Services     [facility] : Hadii aad ku hadchazsachin Makaylade, waaxda luqadaha, qaybta kasadiqjayden clay, loc benson. So [facility] [phone].    ATENCIÓN: Si habla español, tiene a herman disposición servicios gratuitos de asistencia lingüística. Llame al [phone].    We comply with applicable federal civil rights laws and [place] laws. We do not discriminate on the basis of race, color, national origin, age, disability sex, sexual orientation or gender identity.            Thank you!     Thank you for choosing [facility]  [place] your [place]. Our goal is always to provide you with excellent care. Hearing back from our patients is one way we can continue to improve our services. Please take a few minutes to complete the written survey that you may receive in the mail after your visit with us. Thank you!             Your Updated Medication List - Protect others around you: Learn how to safely use, store and throw away your medicines at www.disposemymeds.org.          This list is accurate as of: 7/24/17  9:15 AM.  Always use your most recent med list.                   Brand Name Dispense Instructions for use Diagnosis    doxylamine 25 MG Tabs tablet    UNISOM    28 each    Take 0.5 tablets (12.5 mg) by mouth 3 times daily Take with vitamin B6 for nausea/vomiting    Supervision of normal pregnancy, Nausea and vomiting during pregnancy       * prenatal multivitamin  plus iron 27-0.8 MG Tabs per tablet      Take 1 tablet by mouth daily        * prenatal multivitamin  plus iron 27-0.8 MG Tabs per tablet     100 tablet    Take 1 tablet by mouth daily    Supervision of normal pregnancy       pyridOXINE 25 MG tablet    vitamin B-6    100 tablet    Take 1 tablet (25 mg) by mouth 3 times daily Take with unisom for nausea/vomiting    Supervision of normal pregnancy, Nausea and vomiting during pregnancy       * Notice:  This list has 2 medication(s) that are the same as  other medications prescribed for you. Read the directions carefully, and ask your doctor or other care provider to review them with you.

## 2017-07-24 NOTE — LETTER
[place] [address] [place] [address] [address]  [facility] [address]  [phone]          July 24, 2017      Marta Asencio  [address]  [facility] [address]        Dear Marta Asencio,    According to our records, you are overdue for an appointment following an abnormal pap smear.  It is important that you have regular follow up in order to prevent a more serious complication.    Please contact our office at [phone] to schedule an appointment at your earliest convenience.    If you have transferred your care elsewhere, please contact our office and we would be happy to forward your records.  If you have any financial concerns regarding this appointment, please call so that we can discuss this further.      Sincerely,        {RD LETTER PROVIDER:[id number]}

## 2017-07-25 LAB
ABO + RH BLD: NORMAL
ABO + RH BLD: NORMAL
BACTERIA SPEC CULT: NORMAL
BLD GP AB SCN SERPL QL: NORMAL
BLOOD BANK CMNT PATIENT-IMP: NORMAL
HBV SURFACE AG SERPL QL IA: NONREACTIVE
HGB A1 MFR BLD: 96.3 %
HGB A2 MFR BLD: 2.9 %
HGB C MFR BLD: 0 %
HGB E MFR BLD: 0 %
HGB F MFR BLD: 0.8 %
HGB FRACT BLD ELPH-IMP: NORMAL
HGB OTHER MFR BLD: 0 %
HGB S BLD QL SOLY: NORMAL
HGB S MFR BLD: 0 %
HIV 1+2 AB+HIV1 P24 AG SERPL QL IA: NORMAL
MICRO REPORT STATUS: NORMAL
PATH INTERP BLD-IMP: NORMAL
RUBV IGG SERPL IA-ACNC: 10 IU/ML
SPECIMEN EXP DATE BLD: NORMAL
SPECIMEN SOURCE: NORMAL
T PALLIDUM IGG+IGM SER QL: NEGATIVE

## 2017-07-27 ENCOUNTER — OFFICE VISIT (OUTPATIENT)
Dept: URGENT CARE | Facility: URGENT CARE | Age: 32
End: 2017-07-27
Payer: COMMERCIAL

## 2017-07-27 VITALS
BODY MASS INDEX: 27.57 KG/M2 | TEMPERATURE: 98.6 F | WEIGHT: 184 LBS | SYSTOLIC BLOOD PRESSURE: 102 MMHG | HEART RATE: 80 BPM | OXYGEN SATURATION: 99 % | DIASTOLIC BLOOD PRESSURE: 72 MMHG

## 2017-07-27 DIAGNOSIS — Z34.90 SUPERVISION OF NORMAL PREGNANCY: ICD-10-CM

## 2017-07-27 DIAGNOSIS — R30.0 DYSURIA: Primary | ICD-10-CM

## 2017-07-27 DIAGNOSIS — M54.50 ACUTE LEFT-SIDED LOW BACK PAIN WITHOUT SCIATICA: ICD-10-CM

## 2017-07-27 LAB
ALBUMIN UR-MCNC: NEGATIVE MG/DL
APPEARANCE UR: CLEAR
BACTERIA #/AREA URNS HPF: ABNORMAL /HPF
BILIRUB UR QL STRIP: NEGATIVE
COLOR UR AUTO: YELLOW
GLUCOSE UR STRIP-MCNC: NEGATIVE MG/DL
HGB UR QL STRIP: NEGATIVE
KETONES UR STRIP-MCNC: NEGATIVE MG/DL
LEUKOCYTE ESTERASE UR QL STRIP: NEGATIVE
NITRATE UR QL: POSITIVE
NON-SQ EPI CELLS #/AREA URNS LPF: ABNORMAL /LPF
PH UR STRIP: 6.5 PH (ref 5–7)
RBC #/AREA URNS AUTO: ABNORMAL /HPF (ref 0–2)
SP GR UR STRIP: 1.01 (ref 1–1.03)
URN SPEC COLLECT METH UR: ABNORMAL
UROBILINOGEN UR STRIP-ACNC: 0.2 EU/DL (ref 0.2–1)
WBC #/AREA URNS AUTO: ABNORMAL /HPF (ref 0–2)

## 2017-07-27 PROCEDURE — 87086 URINE CULTURE/COLONY COUNT: CPT | Performed by: PHYSICIAN ASSISTANT

## 2017-07-27 PROCEDURE — 81001 URINALYSIS AUTO W/SCOPE: CPT | Performed by: OBSTETRICS & GYNECOLOGY

## 2017-07-27 PROCEDURE — 99203 OFFICE O/P NEW LOW 30 MIN: CPT | Performed by: PHYSICIAN ASSISTANT

## 2017-07-27 NOTE — NURSING NOTE
"Chief Complaint   Patient presents with     Musculoskeletal Problem     pt. states that she has lower back pain on her left side. pt. stated that she has already been tested but have not gotten back results and would like to get it done here. 1x day       Initial /72 (BP Location: Left arm, Patient Position: Chair, Cuff Size: Adult Regular)  LMP 05/20/2017 Estimated body mass index is 26.97 kg/(m^2) as calculated from the following:    Height as of 7/24/17: 5' 8.5\" (1.74 m).    Weight as of 7/24/17: 180 lb (81.6 kg).  Medication Reconciliation: complete    "

## 2017-07-27 NOTE — MR AVS SNAPSHOT
After Visit Summary   7/27/2017    Marta Asencio    MRN: [id number]           Patient Information     Date Of Birth          1985        Visit Information        Provider Department      7/27/2017 3:55 PM Nasrin Villanueva PA-C [facility]        Today's Diagnoses     Dysuria    -  1    Acute left-sided low back pain without sciatica          Care Instructions    Positive Nitrites in urine -- report results to Dr. Ingram          Follow-ups after your visit        Your next 10 appointments already scheduled     Aug 08, 2017  8:00 AM CDT   New Prenatal with Carmen Ingram MD   [facility] ([facility]    [address] [place] [address] [phone]              Who to contact     If you have questions or need follow up information about today's clinic visit or your schedule please contact [facility] directly at [phone].  Normal or non-critical lab and imaging results will be communicated to you by MyChart, letter or phone within 4 business days after the clinic has received the results. If you do not hear from us within 7 days, please contact the clinic through PeerSpacehart or phone. If you have a critical or abnormal lab result, we will notify you by phone as soon as possible.  Submit refill requests through ClearAccess or call your pharmacy and they will forward the refill request to us. Please allow 3 business days for your refill to be completed.          Additional Information About Your Visit        Care EveryWhere ID     This is your Care EveryWhere ID. This could be used by other organizations to access your [place] medical records  [id number]        Your Vitals Were     Pulse Temperature Last Period Pulse Oximetry BMI (Body Mass Index)       80 98.6  F (37  C) (Oral) 05/20/2017 99% 27.57 kg/m2        Blood Pressure from Last 3 Encounters:   07/27/17 102/72    07/24/17 102/68   07/20/17 107/68    Weight from Last 3 Encounters:   07/27/17 184 lb (83.5 kg)   07/24/17 180 lb (81.6 kg)   07/20/17 180 lb 6.4 oz (81.8 kg)              We Performed the Following     UA with Microscopic reflex to Culture     Urine Culture Aerobic Bacterial        Primary Care Provider Office Phone # Fax #    Carmen Ingram [phone]       [facility] [address] [facility] [address]  [facility] [address]        Equal Access to Services     [facility]: Hadii aad ku hadasho Soomaali, waaxda luqadaha, qaybta kaalmada adeegyada, loc orona . So [facility] [phone].    ATENCIÓN: Si habla español, tiene a herman disposición servicios gratuitos de asistencia lingüística. [facility] [phone].    We comply with applicable federal civil rights laws and [place] laws. We do not discriminate on the basis of race, color, national origin, age, disability sex, sexual orientation or gender identity.            Thank you!     Thank you for choosing [facility]  for your care. Our goal is always to provide you with excellent care. Hearing back from our patients is one way we can continue to improve our services. Please take a few minutes to complete the written survey that you may receive in the mail after your visit with us. Thank you!             Your Updated Medication List - Protect others around you: Learn how to safely use, store and throw away your medicines at www.disposemymeds.org.          This list is accurate as of: 7/27/17  5:01 PM.  Always use your most recent med list.                   Brand Name Dispense Instructions for use Diagnosis    doxylamine 25 MG Tabs tablet    UNISOM    28 each    Take 0.5 tablets (12.5 mg) by mouth 3 times daily Take with vitamin B6 for nausea/vomiting    Supervision of normal pregnancy, Nausea and vomiting during pregnancy       * prenatal multivitamin  plus iron 27-0.8 MG Tabs per tablet       Take 1 tablet by mouth daily        * prenatal multivitamin  plus iron 27-0.8 MG Tabs per tablet     100 tablet    Take 1 tablet by mouth daily    Supervision of normal pregnancy       pyridOXINE 25 MG tablet    vitamin B-6    100 tablet    Take 1 tablet (25 mg) by mouth 3 times daily Take with unisom for nausea/vomiting    Supervision of normal pregnancy, Nausea and vomiting during pregnancy       * Notice:  This list has 2 medication(s) that are the same as other medications prescribed for you. Read the directions carefully, and ask your doctor or other care provider to review them with you.

## 2017-07-27 NOTE — LETTER
[facility]  [address] [address]  Phone: [phone]    July 27, 2017        Marta Asencio  [address]  [place] [facility] [place] [address]          To whom it may concern:    RE: Marta Asencio    Patient was seen and treated today at our clinic. Please excuse her from work on 7/27/2017.       Please contact me for questions or concerns.      Sincerely,        Nasrin Villanueva PA-C

## 2017-07-28 LAB
BACTERIA SPEC CULT: NORMAL
MICRO REPORT STATUS: NORMAL
SPECIMEN SOURCE: NORMAL

## 2017-07-28 NOTE — PROGRESS NOTES
SUBJECTIVE  HPI: Marta Asencio is a 31 year old female who presents for evaluation of back pain  Symptoms began 2 day(s) ago, have been onset acute and are stable.  Pain is located in the low back/lumbar region worse on the left side, with radiation to does not radiate, and are at worst a 5 on a scale of 1-10.  Recent injury:none recalled by the patient. Patient is 10 weeks pregnant. She denies any bleeding.   Personal hx of back pain is recurrent self limited episodes of low back pain in the past.  Pain is exacerbated by: changing position.  Pain is relieved by: none[unfilled] sx include: DENIES: fecal incontinence, lower extremity numbness, tingling, urinary incontinence, radicular lower extremity symptoms and lower extremity weakness.  Red flag symptoms: negative.    ## Patient received a phone call from her OB to obtain a repeat UA.     Past Medical History:   Diagnosis Date     NO ACTIVE PROBLEMS      Current Outpatient Prescriptions   Medication Sig Dispense Refill     Prenatal Vit-Fe Fumarate-FA (PRENATAL MULTIVITAMIN  PLUS IRON) 27-0.8 MG TABS per tablet Take 1 tablet by mouth daily       Prenatal Vit-Fe Fumarate-FA (PRENATAL MULTIVITAMIN  PLUS IRON) 27-0.8 MG TABS per tablet Take 1 tablet by mouth daily 100 tablet 3     pyridOXINE (VITAMIN B-6) 25 MG tablet Take 1 tablet (25 mg) by mouth 3 times daily Take with unisom for nausea/vomiting 100 tablet 1     doxylamine (UNISOM) 25 MG TABS tablet Take 0.5 tablets (12.5 mg) by mouth 3 times daily Take with vitamin B6 for nausea/vomiting 28 each 1     Social History   Substance Use Topics     Smoking status: Former Smoker     Packs/day: 0.50     Smokeless tobacco: Never Used      Comment: 6-7 per day     Alcohol use Yes       ROS:  Review of systems negative except as stated above.    OBJECTIVE:  /72 (BP Location: Left arm, Patient Position: Chair, Cuff Size: Adult Regular)  Pulse 80  Temp 98.6  F (37  C) (Oral)  Wt 184 lb (83.5 kg)  LMP  05/20/2017  SpO2 99%  BMI 27.57 kg/m2  Back examination: Back symmetric, no curvature. ROM normal. No CVA tenderness. Mild TTP of left lumbar region.   [unfilled] leg raise test: negative  GENERAL APPEARANCE: healthy, alert and no distress  RESP: lungs clear to auscultation - no rales, rhonchi or wheezes  CV: regular rates and rhythm, normal S1 S2, no murmur noted  NEURO: Normal strength and tone with no weakness or sensory deficit noted, reflexes normal   SKIN: no suspicious lesions or rashes  Results for orders placed or performed in visit on 07/27/17   **UA reflex to Microscopic FUTURE anytime   Result Value Ref Range    Color Urine Yellow     Appearance Urine Clear     Glucose Urine Negative NEG mg/dL    Bilirubin Urine Negative NEG    Ketones Urine Negative NEG mg/dL    Specific Gravity Urine 1.015 1.003 - 1.035    Blood Urine Negative NEG    pH Urine 6.5 5.0 - 7.0 pH    Protein Albumin Urine Negative NEG mg/dL    Urobilinogen Urine 0.2 0.2 - 1.0 EU/dL    Nitrite Urine Positive (A) NEG    Leukocyte Esterase Urine Negative NEG    Source Midstream Urine    Urine Microscopic   Result Value Ref Range    WBC Urine O - 2 0 - 2 /HPF    RBC Urine O - 2 0 - 2 /HPF    Squamous Epithelial /LPF Urine Few FEW /LPF    Bacteria Urine Few (A) NEG /HPF       ASSESSMENT/PLAN:  1. Dysuria  Instructions to report findings to OB, as a repeat urine was requested.     2. Acute left-sided low back pain without sciatica  Tyenol 500-1000mg for pain.   - Urine Culture Aerobic Bacterial        1.  Continue stretching and strengthening exercises.       2.  Continue prn heat or ice application.    Nasrin Villanueva PA-C

## 2017-08-03 ENCOUNTER — PRE VISIT (OUTPATIENT)
Dept: MATERNAL FETAL MEDICINE | Facility: CLINIC | Age: 32
End: 2017-08-03

## 2017-08-08 ENCOUNTER — PRENATAL OFFICE VISIT (OUTPATIENT)
Dept: OBGYN | Facility: CLINIC | Age: 32
End: 2017-08-08
Payer: COMMERCIAL

## 2017-08-08 VITALS
SYSTOLIC BLOOD PRESSURE: 111 MMHG | BODY MASS INDEX: 27.87 KG/M2 | WEIGHT: 186 LBS | DIASTOLIC BLOOD PRESSURE: 70 MMHG | TEMPERATURE: 98 F | HEART RATE: 91 BPM

## 2017-08-08 DIAGNOSIS — Z34.81 ENCOUNTER FOR SUPERVISION OF OTHER NORMAL PREGNANCY IN FIRST TRIMESTER: Primary | ICD-10-CM

## 2017-08-08 PROCEDURE — 81001 URINALYSIS AUTO W/SCOPE: CPT | Performed by: OBSTETRICS & GYNECOLOGY

## 2017-08-08 PROCEDURE — 99207 ZZC PRENATAL VISIT: CPT | Performed by: OBSTETRICS & GYNECOLOGY

## 2017-08-08 NOTE — LETTER
[address] [address]  [phone]      August 8, 2017      Marta Asencio  [address]  [place] [address]              To Whom It May Concern:    Marta Asencio is being seen in our clinic for prenatal care.  Her Estimated Date of Delivery: Feb 24, 2018.  Patient's last menstrual period was 05/20/2017..    Pregnancy has been complicated by nausea, vomiting and fatigue.       Sincerely,          Carmen Ingram MD

## 2017-08-08 NOTE — NURSING NOTE
"Chief Complaint   Patient presents with     Prenatal Care       Initial /70  Pulse 91  Temp 98  F (36.7  C) (Oral)  Wt 186 lb (84.4 kg)  LMP 2017  BMI 27.87 kg/m2 Estimated body mass index is 27.87 kg/(m^2) as calculated from the following:    Height as of 17: 5' 8.5\" (1.74 m).    Weight as of this encounter: 186 lb (84.4 kg).  BP completed using cuff size: regular        The following HM Due: NONE      The following patient reported/Care Every where data was sent to:  P ABSTRACT QUALITY INITIATIVES [88976]  Pap smear done on this date:  (approximately), by this group: [facility], results were negative.      N/a      Marcelle Trujillo MA               "

## 2017-08-08 NOTE — LETTER
Marta Lily Asencio  [address]  [place] [place] [address]        August 9, 2017          Dear ,    We are writing to inform you of your test results.    Your test results fall within the expected range(s) or remain unchanged from previous results.  Please continue with current treatment plan.    Resulted Orders   UA reflex to Microscopic   Result Value Ref Range    Color Urine Yellow     Appearance Urine Clear     Glucose Urine Negative NEG mg/dL    Bilirubin Urine Negative NEG    Ketones Urine Negative NEG mg/dL    Specific Gravity Urine 1.015 1.003 - 1.035    Blood Urine Negative NEG    pH Urine 6.5 5.0 - 7.0 pH    Protein Albumin Urine Negative NEG mg/dL    Urobilinogen Urine 0.2 0.2 - 1.0 EU/dL    Nitrite Urine Positive (A) NEG    Leukocyte Esterase Urine Negative NEG    Source Midstream Urine    Urine Microscopic   Result Value Ref Range    WBC Urine O - 2 0 - 2 /HPF    RBC Urine O - 2 0 - 2 /HPF    Squamous Epithelial /LPF Urine Many (A) FEW /LPF    Bacteria Urine Many (A) NEG /HPF       If you have any questions or concerns, please call the clinic at the number listed above.       Sincerely,        Carmen Ingram MD  [facility]

## 2017-08-08 NOTE — PROGRESS NOTES
SUBJECTIVE: Marta Asencio is an 31 year old female here for initial OB visit.  Patient's last menstrual period was 05/20/2017.        Her pregnancy by LMP is at 11 weeks 2 days.  JOSELITO is February 24 confirmed by 9 weeks 4 days US.    Pregnancy has been complicated by nausea and dehydration.    Past Medical History:   Diagnosis Date     NO ACTIVE PROBLEMS        Past Surgical History:   Procedure Laterality Date     LEEP TX, CERVICAL N/A 09/09/2005    FREIDA 3 of cervix        Family History   Problem Relation Age of Onset     Alcohol/Drug Mother      Depression Mother      GERD Mother      MENTAL ILLNESS Mother      GASTROINTESTINAL DISEASE Father      hepatitis     Depression Father      DIABETES Father      Colon Cancer Father      Tuberculosis Father      Hyperlipidemia Father      MENTAL ILLNESS Father      Respiratory Daughter      POSSIBLE ASTHSMA     CEREBROVASCULAR DISEASE Maternal Grandfather        Social History   Substance Use Topics     Smoking status: Former Smoker     Packs/day: 0.50     Smokeless tobacco: Never Used      Comment: 6-7 per day     Alcohol use Yes         Review of Systems: No complaints other than HPI (see below)        History Since Last Menstrual Period: seen in ED for nausea and dehydration.  She is taking prenatal vitamins, unisom and B6 prn nausea/vomiting.     EXAM: Last menstrual period 05/20/2017.  GENERAL APPEARANCE: healthy, alert and no distress  EYES: EOMI,  PERRL  HENT: ear canals and TM's normal and nose and mouth without ulcers or lesions  RESP: lungs clear to auscultation - no rales, rhonchi or wheezes  CV: regular rates and rhythm, normal S1 S2, no S3 or S4 and no murmur, click or rub -  ABDOMEN:  soft, nontender, no HSM or masses and bowel sounds normal    Pelvic exam: not examined       ASSESSMENT/ PLAN:  Discussed 1st and 2nd trimester screen, discussed genetic, neural tube defect risks.  Discussed prenatal vits, diet , exercise.  Discussed sexual activity.  Also  prenatal care, hospital care, group practice.  She plans 1st trimester screen, will check w/her insurer. RTC 4 weeks, routine prenatal labs.

## 2017-08-08 NOTE — MR AVS SNAPSHOT
After Visit Summary   8/8/2017    Marta Asencio    MRN: [id number]           Patient Information     Date Of Birth          1985        Visit Information        Provider Department      8/8/2017 8:00 AM Carmen Ingram MD [facility]        Today's Diagnoses     Encounter for supervision of other normal pregnancy in first trimester    -  1       Follow-ups after your visit        Your next 10 appointments already scheduled     Sep 08, 2017  9:00 AM CDT   ESTABLISHED PRENATAL with Carmen Ingram MD   [facility] ([facility])    [address] [place] [id number] [phone]              Who to contact     If you have questions or need follow up information about today's clinic visit or your schedule please contact [facility] directly at [phone].  Normal or non-critical lab and imaging results will be communicated to you by MyChart, letter or phone within 4 business days after the clinic has received the results. If you do not hear from us within 7 days, please contact the clinic through MyChart or phone. If you have a critical or abnormal lab result, we will notify you by phone as soon as possible.  Submit refill requests through [employer] or call your pharmacy and they will forward the refill request to us. Please allow 3 business days for your refill to be completed.          Additional Information About Your Visit        Care EveryWhere ID     This is your Care EveryWhere ID. This could be used by other organizations to access your [place] medical records  [id number]        Your Vitals Were     Pulse Temperature Last Period BMI (Body Mass Index)          91 98  F (36.7  C) (Oral) 05/20/2017 27.87 kg/m2         Blood Pressure from Last 3 Encounters:   08/08/17 111/70   07/27/17 102/72   07/24/17 102/68    Weight from Last 3 Encounters:   08/08/17 186 lb (84.4 kg)   07/27/17 184 lb (83.5 kg)    07/24/17 180 lb (81.6 kg)              We Performed the Following     UA reflex to Microscopic     Urine Microscopic        Primary Care Provider Office Phone # Fax #    Carmen Ingram [phone]       [address] [facility] [address]  [facility] [address]        Equal Access to Services     BECCA SHEIKH : Hadii aad ku hadasho Soomaali, waaxda luqadaha, qaybta kaalmada adeegyada, waxay jilin hayemren adeangelia reema lasuzie benson. So [facility] [phone].    ATENCIÓN: Si habla español, tiene a herman disposición servicios gratuitos de asistencia lingüística. Antonio al [phone].    We comply with applicable federal civil rights laws and [place] laws. We do not discriminate on the basis of race, color, national origin, age, disability sex, sexual orientation or gender identity.            Thank you!     Thank you for choosing [facility]  for your care. Our goal is always to provide you with excellent care. Hearing back from our patients is one way we can continue to improve our services. Please take a few minutes to complete the written survey that you may receive in the mail after your visit with us. Thank you!             Your Updated Medication List - Protect others around you: Learn how to safely use, store and throw away your medicines at www.disposemymeds.org.          This list is accurate as of: 8/8/17 11:59 PM.  Always use your most recent med list.                   Brand Name Dispense Instructions for use Diagnosis    doxylamine 25 MG Tabs tablet    UNISOM    28 each    Take 0.5 tablets (12.5 mg) by mouth 3 times daily Take with vitamin B6 for nausea/vomiting    Supervision of normal pregnancy, Nausea and vomiting during pregnancy       * prenatal multivitamin plus iron 27-0.8 MG Tabs per tablet      Take 1 tablet by mouth daily        * prenatal multivitamin plus iron 27-0.8 MG Tabs per tablet     100 tablet    Take 1 tablet by mouth daily    Supervision of normal pregnancy       pyridOXINE  25 MG tablet    vitamin B-6    100 tablet    Take 1 tablet (25 mg) by mouth 3 times daily Take with unisom for nausea/vomiting    Supervision of normal pregnancy, Nausea and vomiting during pregnancy       * Notice:  This list has 2 medication(s) that are the same as other medications prescribed for you. Read the directions carefully, and ask your doctor or other care provider to review them with you.

## 2017-08-09 ENCOUNTER — OFFICE VISIT (OUTPATIENT)
Dept: MATERNAL FETAL MEDICINE | Facility: CLINIC | Age: 32
End: 2017-08-09
Attending: OBSTETRICS & GYNECOLOGY
Payer: COMMERCIAL

## 2017-08-09 ENCOUNTER — HOSPITAL ENCOUNTER (OUTPATIENT)
Dept: ULTRASOUND IMAGING | Facility: CLINIC | Age: 32
Discharge: HOME | End: 2017-08-09
Attending: OBSTETRICS & GYNECOLOGY | Admitting: OBSTETRICS & GYNECOLOGY
Payer: COMMERCIAL

## 2017-08-09 DIAGNOSIS — O26.90 PREGNANCY RELATED CONDITION, UNSPECIFIED TRIMESTER: ICD-10-CM

## 2017-08-09 DIAGNOSIS — Z36.0 ENCOUNTER FOR ANTENATAL SCREENING FOR CHROMOSOMAL ANOMALIES: Primary | ICD-10-CM

## 2017-08-09 DIAGNOSIS — Z36.9 FIRST TRIMESTER SCREENING: Primary | ICD-10-CM

## 2017-08-09 PROCEDURE — 36415 COLL VENOUS BLD VENIPUNCTURE: CPT | Performed by: OBSTETRICS & GYNECOLOGY

## 2017-08-09 PROCEDURE — 40000791 ZZHCL STATISTIC VERIFI PRENATAL TRISOMY 21,18,13: Performed by: OBSTETRICS & GYNECOLOGY

## 2017-08-09 PROCEDURE — 76813 OB US NUCHAL MEAS 1 GEST: CPT

## 2017-08-09 PROCEDURE — 96040 ZZH GENETIC COUNSELING, EACH 30 MINUTES: CPT | Mod: ZF | Performed by: GENETIC COUNSELOR, MS

## 2017-08-09 NOTE — MR AVS SNAPSHOT
After Visit Summary   2017    Marta Asencio    MRN: [id number]           Patient Information     Date Of Birth          1985        Visit Information        Provider Department      2017 8:45 AM Cristela Fox GC [facility]        Today's Diagnoses     Encounter for  screening for chromosomal anomalies    -  1    Pregnancy related condition, unspecified trimester           Follow-ups after your visit        Your next 10 appointments already scheduled     Sep 08, 2017  9:00 AM CDT   ESTABLISHED PRENATAL with Carmen Ingram MD   [facility] ([facility])    [address] [place] [id number] [phone]              Who to contact     If you have questions or need follow up information about today's clinic visit or your schedule please contact [facility] MATERNAL FETAL MEDICINE [facility] directly at [phone].  Normal or non-critical lab and imaging results will be communicated to you by MyChart, letter or phone within 4 business days after the clinic has received the results. If you do not hear from us within 7 days, please contact the clinic through MyChart or phone. If you have a critical or abnormal lab result, we will notify you by phone as soon as possible.  Submit refill requests through [employer] or call your pharmacy and they will forward the refill request to us. Please allow 3 business days for your refill to be completed.          Additional Information About Your Visit        Care EveryWhere ID     This is your Care EveryWhere ID. This could be used by other organizations to access your [place] medical records  [id number]        Your Vitals Were     Last Period                   2017            Blood Pressure from Last 3 Encounters:   17 111/70   17 102/72   17 102/68    Weight from Last 3 Encounters:   17 84.4 kg (186 lb)   17  83.5 kg (184 lb)   07/24/17 81.6 kg (180 lb)              We Performed the Following     [facility] Genetic Counseling     Non Invasive Prenatal Test Cell Free DNA        Primary Care Provider Office Phone # Fax #    Carmen Ingram [phone]       [address] [facility] [address]  [facility] [address]        Equal Access to Services     [facility]: Hadii aad ku hadasho Soomaali, waaxda luqadaha, qaybta kaalmada adeegyada, waxay idiin hayaan adeeg khreginash lasuzie . So [facility] [phone].    ATENCIÓN: Si habla español, tiene a herman disposición servicios gratuitos de asistencia lingüística. Llame al [phone].    We comply with applicable federal civil rights laws and [place] laws. We do not discriminate on the basis of race, color, national origin, age, disability sex, sexual orientation or gender identity.            Thank you!     Thank you for choosing [facility]  for your care. Our goal is always to provide you with excellent care. Hearing back from our patients is one way we can continue to improve our services. Please take a few minutes to complete the written survey that you may receive in the mail after your visit with us. Thank you!             Your Updated Medication List - Protect others around you: Learn how to safely use, store and throw away your medicines at www.disposemymeds.org.          This list is accurate as of: 8/9/17  3:01 PM.  Always use your most recent med list.                   Brand Name Dispense Instructions for use Diagnosis    doxylamine 25 MG Tabs tablet    UNISOM    28 each    Take 0.5 tablets (12.5 mg) by mouth 3 times daily Take with vitamin B6 for nausea/vomiting    Supervision of normal pregnancy, Nausea and vomiting during pregnancy       * prenatal multivitamin plus iron 27-0.8 MG Tabs per tablet      Take 1 tablet by mouth daily        * prenatal multivitamin plus iron 27-0.8 MG Tabs per tablet     100 tablet    Take 1 tablet by mouth  daily    Supervision of normal pregnancy       pyridOXINE 25 MG tablet    vitamin B-6    100 tablet    Take 1 tablet (25 mg) by mouth 3 times daily Take with unisom for nausea/vomiting    Supervision of normal pregnancy, Nausea and vomiting during pregnancy       * Notice:  This list has 2 medication(s) that are the same as other medications prescribed for you. Read the directions carefully, and ask your doctor or other care provider to review them with you.

## 2017-08-09 NOTE — MR AVS SNAPSHOT
After Visit Summary   8/9/2017    Marta Asencio    MRN: [id number]           Patient Information     Date Of Birth          1985        Visit Information        Provider Department      8/9/2017 10:00 AM Ada Shelton DO [facility] Maternal Fetal Medicine [facility]        Today's Diagnoses     First trimester screening    -  1       Follow-ups after your visit        Your next 10 appointments already scheduled     Sep 08, 2017  9:00 AM CDT   ESTABLISHED PRENATAL with Carmen Ingram MD   [facility] [place]    [address] [place] [id number] [phone]              Who to contact     If you have questions or need follow up information about today's clinic visit or your schedule please contact [facility] MATERNAL FETAL MEDICINE [facility] directly at [phone].  Normal or non-critical lab and imaging results will be communicated to you by MyChart, letter or phone within 4 business days after the clinic has received the results. If you do not hear from us within 7 days, please contact the clinic through MyChart or phone. If you have a critical or abnormal lab result, we will notify you by phone as soon as possible.  Submit refill requests through [employer] or call your pharmacy and they will forward the refill request to us. Please allow 3 business days for your refill to be completed.          Additional Information About Your Visit        Care EveryWhere ID     This is your Care EveryWhere ID. This could be used by other organizations to access your [place] medical records  [id number]        Your Vitals Were     Last Period                   05/20/2017            Blood Pressure from Last 3 Encounters:   08/08/17 111/70   07/27/17 102/72   07/24/17 102/68    Weight from Last 3 Encounters:   08/08/17 84.4 kg (186 lb)   07/27/17 83.5 kg (184 lb)   07/24/17 81.6 kg (180 lb)              Today, you had the following      No orders found for display       Primary Care Provider Office Phone # Fax #    Carmen Ingram [phone]       [address] [facility] [address]  [facility] [address]        Equal Access to Services     BECCA SHEIKH : Hadii naima garcia nicolasa Kimball, waaxda luqadaha, qaybta kaalmada adeegyada, loc benavidez laAdriannahadley benson. So [facility] [phone].    ATENCIÓN: Si habla español, tiene a herman disposición servicios gratuitos de asistencia lingüística. Llame al [phone].    We comply with applicable federal civil rights laws and [place] laws. We do not discriminate on the basis of race, color, national origin, age, disability sex, sexual orientation or gender identity.            Thank you!     Thank you for choosing [facility]  for your care. Our goal is always to provide you with excellent care. Hearing back from our patients is one way we can continue to improve our services. Please take a few minutes to complete the written survey that you may receive in the mail after your visit with us. Thank you!             Your Updated Medication List - Protect others around you: Learn how to safely use, store and throw away your medicines at www.disposemymeds.org.          This list is accurate as of: 8/9/17 11:14 AM.  Always use your most recent med list.                   Brand Name Dispense Instructions for use Diagnosis    doxylamine 25 MG Tabs tablet    UNISOM    28 each    Take 0.5 tablets (12.5 mg) by mouth 3 times daily Take with vitamin B6 for nausea/vomiting    Supervision of normal pregnancy, Nausea and vomiting during pregnancy       * prenatal multivitamin plus iron 27-0.8 MG Tabs per tablet      Take 1 tablet by mouth daily        * prenatal multivitamin plus iron 27-0.8 MG Tabs per tablet     100 tablet    Take 1 tablet by mouth daily    Supervision of normal pregnancy       pyridOXINE 25 MG tablet    vitamin B-6    100 tablet    Take 1 tablet (25 mg) by  mouth 3 times daily Take with unisom for nausea/vomiting    Supervision of normal pregnancy, Nausea and vomiting during pregnancy       * Notice:  This list has 2 medication(s) that are the same as other medications prescribed for you. Read the directions carefully, and ask your doctor or other care provider to review them with you.

## 2017-08-09 NOTE — PROGRESS NOTES
[facility]  Genetic Counseling Consult    Patient: Marta Asencio YOB: 1985   Date of Service: 17      Marta Asencio was seen at [facility] for genetic consultation to discuss the options for routine screening for fetal chromosome abnormalities.       Impression/Plan:   1.  Marta had an ultrasound and blood draw for NIPT (Innatal test through [employer]).  Results are expected within 7-10 days, and will be available in [employer].  We will contact her to discuss the results, and a copy will be forwarded to the office of the referring OB provider. Marta requested a detailed message with results on her voicemail ([phone]). She requests fetal sex information on her voicemail.     2. Maternal serum AFP (single marker screen) is recommended after 15 weeks to screen for open neural tube defects. A quad screen should not be performed.    Pregnancy History:   /Parity:    Age at Delivery: 32 year old  JOSELITO: 2018, by Ultrasound  Gestational Age: 12w3d    No significant complications or exposures were reported in the current pregnancy.    Marta her pregnancy history is significant for two healthy daughters from previous relationships, a 6 week miscarriage and 2 EABs.     Medical History:   Marta her reported medical history is not expected to impact pregnancy management or risks to fetal development.       Family History:   A three-generation pedigree was obtained, and is scanned under the  Media  tab.   The reported family history is negative for multiple miscarriages, stillbirths, birth defects, mental retardation, known genetic conditions, and consanguinity.       Carrier Screening:     The patient reports that she and the father of the pregnancy have  ancestry:      We reviewed the clinical features, autosomal recessive inheritance, and options for carrier screening and   screening for hemoglobinopathies.  Marta reports her partner is a sickle cell carrier.  We discussed that Marta had a normal hemoglobin electrophoresis and CBC so this pregnancy is not at increased risk for sickle cell disease.  We discussed her pregnancy has a 50% chance to be a carrier.      Expanded carrier screening for mutations in a large panel of genes associated with autosomal recessive conditions including cystic fibrosis, spinal muscular atrophy, and others, is now available.      Risk Assessment for Chromosome Conditions:   We explained that the risk for fetal chromosome abnormalities increases with maternal age. We discussed specific features of common chromosome abnormalities, including Down syndrome, trisomy 13, trisomy 18, and sex chromosome trisomies.      - At age 32 at midtrimester, the risk to have a baby with Down syndrome is 1 in 342.     - At age 32 at midtrimester, the risk to have a baby with any chromosome abnormality is 1 in  172.          Testing Options:   We discussed the following options:   First trimester screening    First trimester ultrasound with nuchal translucency and nasal bone assessments, maternal plasma hCG, ALEX-A, and AFP measurement    Screens for fetal trisomy 21, trisomy 13, and trisomy 18    Cannot screen for open neural tube defects; maternal serum AFP after 15 weeks is recommended     Non-invasive Prenatal Testing (NIPT)    Maternal plasma cell-free DNA testing; first trimester ultrasound with nuchal translucency and nasal bone assessment is recommended, when appropriate    Screens for fetal trisomy 21, trisomy 13, trisomy 18, and sex chromosome aneuploidy    Cannot screen for open neural tube defects; maternal serum AFP after 15 weeks is recommended    Marta came to our office requesting NIPT.  We discussed the potential lack of insurance coverage for this testing in low risk women and the increased chance for a false positive result.  After this discussion Marta  requested to have NIPT.       Chorionic villus sampling (CVS)    Invasive procedure typically performed in the first trimester by which placental villi are obtained for the purpose of chromosome analysis and/or other prenatal genetic analysis    Diagnostic results; >99% sensitivity for fetal chromosome abnormalities    Cannot test for open neural tube defects; maternal serum AFP after 15 weeks is recommended     Genetic Amniocentesis    Invasive procedure typically performed in the second trimester by which amniotic fluid is obtained for the purpose of chromosome analysis and/or other prenatal genetic analysis    Diagnostic results; >99% sensitivity for fetal chromosome abnormalities    AFAFP measurement tests for open neural tube defects        We reviewed the benefits and limitations of this testing.  Screening tests provide a risk assessment specific to the pregnancy for certain fetal chromosome abnormalities, but cannot definitively diagnose or exclude a fetal chromosome abnormality.  Follow-up genetic counseling and consideration of diagnostic testing is recommended with any abnormal screening result.      It was a pleasure [facility] be involved [facility]. Face-to-face time of the meeting was 30 minutes.    Cristela Fox [facility]  Certified Genetic Counselor  VM: [phone]

## 2017-08-09 NOTE — PROGRESS NOTES
"Please see \"Imaging\" tab under \"Chart Review\" for details of today's US.    Ada Shelton, DO    "

## 2017-08-10 ENCOUNTER — OFFICE VISIT (OUTPATIENT)
Dept: URGENT CARE | Facility: URGENT CARE | Age: 32
End: 2017-08-10
Payer: COMMERCIAL

## 2017-08-10 VITALS
BODY MASS INDEX: 27.87 KG/M2 | DIASTOLIC BLOOD PRESSURE: 62 MMHG | SYSTOLIC BLOOD PRESSURE: 104 MMHG | WEIGHT: 186 LBS | OXYGEN SATURATION: 98 % | HEART RATE: 92 BPM | TEMPERATURE: 98.2 F

## 2017-08-10 DIAGNOSIS — N39.0 ACUTE UTI: Primary | ICD-10-CM

## 2017-08-10 DIAGNOSIS — R30.0 DYSURIA: ICD-10-CM

## 2017-08-10 DIAGNOSIS — Z34.91 FIRST TRIMESTER PREGNANCY: ICD-10-CM

## 2017-08-10 LAB
ALBUMIN UR-MCNC: NEGATIVE MG/DL
APPEARANCE UR: CLEAR
BACTERIA #/AREA URNS HPF: ABNORMAL /HPF
BILIRUB UR QL STRIP: NEGATIVE
COLOR UR AUTO: YELLOW
GLUCOSE UR STRIP-MCNC: NEGATIVE MG/DL
HGB UR QL STRIP: NEGATIVE
KETONES UR STRIP-MCNC: NEGATIVE MG/DL
LEUKOCYTE ESTERASE UR QL STRIP: NEGATIVE
MUCOUS THREADS #/AREA URNS LPF: PRESENT /LPF
NITRATE UR QL: POSITIVE
NON-SQ EPI CELLS #/AREA URNS LPF: ABNORMAL /LPF
PH UR STRIP: 6 PH (ref 5–7)
RBC #/AREA URNS AUTO: ABNORMAL /HPF (ref 0–2)
SP GR UR STRIP: 1.02 (ref 1–1.03)
URN SPEC COLLECT METH UR: ABNORMAL
UROBILINOGEN UR STRIP-ACNC: 0.2 EU/DL (ref 0.2–1)
WBC #/AREA URNS AUTO: ABNORMAL /HPF (ref 0–2)

## 2017-08-10 PROCEDURE — 99214 OFFICE O/P EST MOD 30 MIN: CPT | Performed by: PHYSICIAN ASSISTANT

## 2017-08-10 PROCEDURE — 87591 N.GONORRHOEAE DNA AMP PROB: CPT | Performed by: PHYSICIAN ASSISTANT

## 2017-08-10 PROCEDURE — 81001 URINALYSIS AUTO W/SCOPE: CPT | Performed by: PHYSICIAN ASSISTANT

## 2017-08-10 PROCEDURE — 87491 CHLMYD TRACH DNA AMP PROBE: CPT | Performed by: PHYSICIAN ASSISTANT

## 2017-08-10 PROCEDURE — 87086 URINE CULTURE/COLONY COUNT: CPT | Performed by: PHYSICIAN ASSISTANT

## 2017-08-10 RX ORDER — AMOXICILLIN 875 MG
875 TABLET ORAL 2 TIMES DAILY
Qty: 10 TABLET | Refills: 0 | Status: SHIPPED | OUTPATIENT
Start: 2017-08-10 | End: 2017-08-15

## 2017-08-10 NOTE — MR AVS SNAPSHOT
After Visit Summary   8/10/2017    Marta Asencio    MRN: [id number]           Patient Information     Date Of Birth          1985        Visit Information        Provider Department      8/10/2017 3:30 PM Regi Stack PA-C [facility]        Today's Diagnoses     Acute UTI    -  1    Dysuria          Care Instructions    (N39.0) Acute UTI  (primary encounter diagnosis)  Comment:   Plan: Urine Culture Aerobic Bacterial, amoxicillin         (AMOXIL) 875 MG tablet            (R30.0) Dysuria  Comment:   Plan: UA with Microscopic reflex to Culture, Urine         Culture Aerobic Bacterial            Call for urine culture results in 3 days.      Follow up with OB.                Follow-ups after your visit        Your next 10 appointments already scheduled     Sep 08, 2017  9:00 AM CDT   ESTABLISHED PRENATAL with Carmen Ingram MD   [facility] ([facility])    [address] [place] [address] [phone]              Who to contact     If you have questions or need follow up information about today's clinic visit or your schedule please contact [facility] directly at [phone].  Normal or non-critical lab and imaging results will be communicated to you by MyChart, letter or phone within 4 business days after the clinic has received the results. If you do not hear from us within 7 days, please contact the clinic through MyChart or phone. If you have a critical or abnormal lab result, we will notify you by phone as soon as possible.  Submit refill requests through [employer] or call your pharmacy and they will forward the refill request to us. Please allow 3 business days for your refill to be completed.          Additional Information About Your Visit        Care EveryWhere ID     This is your Care EveryWhere ID. This could be used by other organizations to access your  [place] medical records  [id number]        Your Vitals Were     Pulse Temperature Last Period Pulse Oximetry BMI (Body Mass Index)       92 98.2  F (36.8  C) (Oral) 05/20/2017 98% 27.87 kg/m2        Blood Pressure from Last 3 Encounters:   08/10/17 104/62   08/08/17 111/70   07/27/17 102/72    Weight from Last 3 Encounters:   08/10/17 186 lb (84.4 kg)   08/08/17 186 lb (84.4 kg)   07/27/17 184 lb (83.5 kg)              We Performed the Following     UA with Microscopic reflex to Culture     Urine Culture Aerobic Bacterial          Today's Medication Changes          These changes are accurate as of: 8/10/17  4:15 PM.  If you have any questions, ask your nurse or doctor.               Start taking these medicines.        Dose/Directions    amoxicillin 875 MG tablet   Commonly known as:  AMOXIL   Used for:  Acute UTI   Started by:  Regi Stack PA-C        Dose:  875 mg   Take 1 tablet (875 mg) by mouth 2 times daily for 5 days   Quantity:  10 tablet   Refills:  0            Where to get your medicines      These medications were sent to [id number] - [facility] [address] AT [address]  [address], [facility] [address]    Hours:  24-hours Phone:  [phone]     amoxicillin 875 MG tablet                Primary Care Provider Office Phone # Fax #    Carmen Ingram [phone] [address] [address]        Equal Access to Services     [facility] AGUILAR AH: Hadii naima garcia hadasho Soomaali, waaxda luqadaha, qaybta kaalmada adeegalisha, waxay idichaparrita benson. So [facility] [phone].    ATENCIÓN: Si habla español, tiene a herman disposición servicios gratuitos de asistencia lingüística. Llame al [phone].    We comply with applicable federal civil rights laws and [place] laws. We do not discriminate on the basis of race, color, national origin, age, disability sex, sexual orientation or gender  identity.            Thank you!     Thank you for choosing [facility]  for your care. Our goal is always to provide you with excellent care. Hearing back from our patients is one way we can continue to improve our services. Please take a few minutes to complete the written survey that you may receive in the mail after your visit with us. Thank you!             Your Updated Medication List - Protect others around you: Learn how to safely use, store and throw away your medicines at www.disposemymeds.org.          This list is accurate as of: 8/10/17  4:15 PM.  Always use your most recent med list.                   Brand Name Dispense Instructions for use Diagnosis    amoxicillin 875 MG tablet    AMOXIL    10 tablet    Take 1 tablet (875 mg) by mouth 2 times daily for 5 days    Acute UTI       doxylamine 25 MG Tabs tablet    UNISOM    28 each    Take 0.5 tablets (12.5 mg) by mouth 3 times daily Take with vitamin B6 for nausea/vomiting    Supervision of normal pregnancy, Nausea and vomiting during pregnancy       * prenatal multivitamin plus iron 27-0.8 MG Tabs per tablet      Take 1 tablet by mouth daily        * prenatal multivitamin plus iron 27-0.8 MG Tabs per tablet     100 tablet    Take 1 tablet by mouth daily    Supervision of normal pregnancy       pyridOXINE 25 MG tablet    vitamin B-6    100 tablet    Take 1 tablet (25 mg) by mouth 3 times daily Take with unisom for nausea/vomiting    Supervision of normal pregnancy, Nausea and vomiting during pregnancy       * Notice:  This list has 2 medication(s) that are the same as other medications prescribed for you. Read the directions carefully, and ask your doctor or other care provider to review them with you.

## 2017-08-10 NOTE — PATIENT INSTRUCTIONS
(N39.0) Acute UTI  (primary encounter diagnosis)  Comment:   Plan: Urine Culture Aerobic Bacterial, amoxicillin         (AMOXIL) 875 MG tablet            (R30.0) Dysuria  Comment:   Plan: UA with Microscopic reflex to Culture, Urine         Culture Aerobic Bacterial            Call for urine culture results in 3 days.      Follow up with OB.

## 2017-08-10 NOTE — LETTER
[facility] [place] [facility]  [address] [address]  [phone]      August 10, 2017    RE:  Marta Asencio                                                                                                                                                       [address]  [facility] [address]            To whom it may concern:    Marta Asencio was seen in clinic today.         Sincerely,        Regi Hanna PAC    [facility]

## 2017-08-10 NOTE — PROGRESS NOTES
SUBJECTIVE:   Marta Asencio is a 31 year old female who  presents today for a possible UTI. Symptoms of dysuria, frequency and back pain have been going on for 1day(s).  Hematuria no.  sudden onsetand moderate.  There is no history of fever, chills, nausea or vomiting.  No history of vaginal discharge. This patient does have a history of urinary tract infections. Patient denies long duration, rigors, flank pain, temperature > 101 degrees F. and Vomiting, significant nausea or diarrhea.     Patient is 12 weeks pregnant.   Next OB appointment is 9/8/17.    Past Medical History:   Diagnosis Date     NO ACTIVE PROBLEMS      Patient Active Problem List   Diagnosis     Need for Tdap vaccination     Encounter for supervision of other normal pregnancy in first trimester     Social History   Substance Use Topics     Smoking status: Former Smoker     Packs/day: 0.50     Smokeless tobacco: Never Used      Comment: 6-7 per day     Alcohol use Yes       ROS:   CONSTITUTIONAL:NEGATIVE for fever, chills, change in weight  INTEGUMENTARY/SKIN: NEGATIVE for worrisome rashes, moles or lesions  : as per HPI    OBJECTIVE:  /62 (BP Location: Left arm, Patient Position: Chair, Cuff Size: Adult Regular)  Pulse 92  Temp 98.2  F (36.8  C) (Oral)  Wt 186 lb (84.4 kg)  LMP 05/20/2017  SpO2 98%  BMI 27.87 kg/m2  GENERAL APPEARANCE: healthy, alert and no distress  ABDOMEN:  soft, nontender, no HSM or masses and bowel sounds normal  BACK: No CVA tenderness  SKIN: no suspicious lesions or rashes    ASSESSMENT:   Lower, uncomplicated urinary tract infection.    PLAN:  AMOXICILLIN  As per ordered above  Drink plenty of fluids.  Prevention and treatment of UTI's discussed.Signs and symptoms of pyelonephritis mentioned.  Follow up with primary care physician if not improving

## 2017-08-11 LAB
BACTERIA SPEC CULT: NORMAL
MICRO REPORT STATUS: NORMAL
SPECIMEN SOURCE: NORMAL

## 2017-08-13 LAB
C TRACH DNA SPEC QL NAA+PROBE: NORMAL
N GONORRHOEA DNA SPEC QL NAA+PROBE: NORMAL
SPECIMEN SOURCE: NORMAL
SPECIMEN SOURCE: NORMAL

## 2017-08-14 ENCOUNTER — TELEPHONE (OUTPATIENT)
Dept: OBGYN | Facility: CLINIC | Age: 32
End: 2017-08-14

## 2017-08-14 DIAGNOSIS — Z34.92 SUPERVISION OF NORMAL PREGNANCY IN SECOND TRIMESTER: Primary | ICD-10-CM

## 2017-08-14 NOTE — TELEPHONE ENCOUNTER
Patient called to get lab results. Asked if a gummy prenatal vitamin rx could be sent to pharmacy. Rx sent to pharmacy.   Maria Del Rosario Campbell, RN-BSN

## 2017-08-16 ENCOUNTER — TELEPHONE (OUTPATIENT)
Dept: MATERNAL FETAL MEDICINE | Facility: CLINIC | Age: 32
End: 2017-08-16

## 2017-08-16 NOTE — TELEPHONE ENCOUNTER
Left message for Marta regarding normal Innatal results consistent with diploid chromosomes 21,18 and 13. This result significantly reduces the chance for her pregnancy to be affected with Down syndrome, trisomy 18 or trisomy 13, but does not reduce it to zero.  Fetal sex was revealed (XX) per patient request.     Cristela Fox MS, [facility]  Certified Genetic Counselor

## 2017-08-20 LAB — LAB SCANNED RESULT: NORMAL

## 2017-08-22 ENCOUNTER — OFFICE VISIT (OUTPATIENT)
Dept: URGENT CARE | Facility: URGENT CARE | Age: 32
End: 2017-08-22
Payer: COMMERCIAL

## 2017-08-22 VITALS
DIASTOLIC BLOOD PRESSURE: 74 MMHG | HEART RATE: 89 BPM | WEIGHT: 186.3 LBS | TEMPERATURE: 98.9 F | BODY MASS INDEX: 27.91 KG/M2 | OXYGEN SATURATION: 97 % | SYSTOLIC BLOOD PRESSURE: 118 MMHG

## 2017-08-22 DIAGNOSIS — R35.0 URINARY FREQUENCY: Primary | ICD-10-CM

## 2017-08-22 LAB
ALBUMIN UR-MCNC: NEGATIVE MG/DL
APPEARANCE UR: CLEAR
BACTERIA #/AREA URNS HPF: ABNORMAL /HPF
BILIRUB UR QL STRIP: NEGATIVE
COLOR UR AUTO: YELLOW
GLUCOSE UR STRIP-MCNC: NEGATIVE MG/DL
HGB UR QL STRIP: NEGATIVE
KETONES UR STRIP-MCNC: ABNORMAL MG/DL
LEUKOCYTE ESTERASE UR QL STRIP: NEGATIVE
MUCOUS THREADS #/AREA URNS LPF: PRESENT /LPF
NITRATE UR QL: NEGATIVE
NON-SQ EPI CELLS #/AREA URNS LPF: ABNORMAL /LPF
PH UR STRIP: 6.5 PH (ref 5–7)
RBC #/AREA URNS AUTO: ABNORMAL /HPF
SOURCE: ABNORMAL
SP GR UR STRIP: 1.02 (ref 1–1.03)
UROBILINOGEN UR STRIP-ACNC: 0.2 EU/DL (ref 0.2–1)
WBC #/AREA URNS AUTO: ABNORMAL /HPF

## 2017-08-22 PROCEDURE — 99214 OFFICE O/P EST MOD 30 MIN: CPT | Performed by: PHYSICIAN ASSISTANT

## 2017-08-22 PROCEDURE — 81001 URINALYSIS AUTO W/SCOPE: CPT | Performed by: PHYSICIAN ASSISTANT

## 2017-08-22 NOTE — LETTER
[facility] [place] [facility]  [address] [address]  [phone]      August 22, 2017    RE:  Marta Asencio                                                                                                                                                       [address]  [facility] [address]            To whom it may concern:    Marta Asencio was seen in the urgent care today.  She missed work yesterday and today due to pregnancy and urinary symptoms.           Sincerely,        Chaim Ortega [facility] PAC    [facility]

## 2017-08-22 NOTE — MR AVS SNAPSHOT
After Visit Summary   8/22/2017    Marta Asencio    MRN: [id number]           Patient Information     Date Of Birth          1985        Visit Information        Provider Department      8/22/2017 4:10 PM Chaim Ortega PA-C [facility]        Today's Diagnoses     Urinary frequency    -  1       Follow-ups after your visit        Additional Services     UROLOGY ADULT REFERRAL       Your provider has referred you to: [facility] [place] [phone]   [email or web address]  [place] [phone]   [email or web address]    Please be aware that coverage of these services is subject to the terms and limitations of your health insurance plan.  Call member services at your health plan with any benefit or coverage questions.      Please bring the following with you to your appointment:    (1) Any X-Rays, CTs or MRIs which have been performed.  Contact the facility where they were done to arrange for  prior to your scheduled appointment.    (2) List of current medications  (3) This referral request   (4) Any documents/labs given to you for this referral                  Your next 10 appointments already scheduled     Aug 28, 2017  8:15 AM CDT   ESTABLISHED PRENATAL with Alisha Rubi MD   [facility] [place] [facility] ([address] [address] [phone]            Sep 08, 2017  9:00 AM CDT   ESTABLISHED PRENATAL with Carmen Ingram MD   [facility] [place] [facility] ([address] [address] [phone]              Who to contact     If you have questions or need follow up information about today's clinic visit or your schedule please contact [facility] directly at [phone].  Normal or non-critical lab and imaging results will be communicated to  you by MyChart, letter or phone within 4 business days after the clinic has received the results. If you do not hear from us within 7 days, please contact the clinic through MyChart or phone. If you have a critical or abnormal lab result, we will notify you by phone as soon as possible.  Submit refill requests through [employer] or call your pharmacy and they will forward the refill request to us. Please allow 3 business days for your refill to be completed.          Additional Information About Your Visit        Care EveryWhere ID     This is your Care EveryWhere ID. This could be used by other organizations to access your [place] medical records  [id number]        Your Vitals Were     Pulse Temperature Last Period Pulse Oximetry BMI (Body Mass Index)       89 98.9  F (37.2  C) (Oral) 05/20/2017 97% 27.91 kg/m2        Blood Pressure from Last 3 Encounters:   08/22/17 118/74   08/10/17 104/62   08/08/17 111/70    Weight from Last 3 Encounters:   08/22/17 186 lb 4.8 oz (84.5 kg)   08/10/17 186 lb (84.4 kg)   08/08/17 186 lb (84.4 kg)              We Performed the Following     UA with Microscopic reflex to Culture     UROLOGY ADULT REFERRAL        Primary Care Provider Office Phone # Fax #    Carmen Ingram [phone]       [address] [facility] [address]  [facility] [address]        Equal Access to Services     ROSALINDA [facility] : Hadii aad ku hadasho Soyunali, waaxda luqadaha, qaybta kaalmada danna, loc orona . So [facility] [phone].    ATENCIÓN: Si habla español, tiene a herman disposición servicios gratuitos de asistencia lingüística. Antonio al [phone].    We comply with applicable federal civil rights laws and [place] laws. We do not discriminate on the basis of race, color, national origin, age, disability sex, sexual orientation or gender identity.            Thank you!     Thank you for choosing [facility]  for your care. Our goal is always  to provide you with excellent care. Hearing back from our patients is one way we can continue to improve our services. Please take a few minutes to complete the written survey that you may receive in the mail after your visit with us. Thank you!             Your Updated Medication List - Protect others around you: Learn how to safely use, store and throw away your medicines at www.disposemymeds.org.          This list is accurate as of: 8/22/17 11:59 PM.  Always use your most recent med list.                   Brand Name Dispense Instructions for use Diagnosis    doxylamine 25 MG Tabs tablet    UNISOM    28 each    Take 0.5 tablets (12.5 mg) by mouth 3 times daily Take with vitamin B6 for nausea/vomiting    Supervision of normal pregnancy, Nausea and vomiting during pregnancy       Prenatal Adult Gummy/DHA/FA 0.4-25 MG Chew     100 tablet    Take 1 chew tab by mouth daily    Supervision of normal pregnancy in second trimester       * prenatal multivitamin plus iron 27-0.8 MG Tabs per tablet      Take 1 tablet by mouth daily        * prenatal multivitamin plus iron 27-0.8 MG Tabs per tablet     100 tablet    Take 1 tablet by mouth daily    Supervision of normal pregnancy       pyridOXINE 25 MG tablet    vitamin B-6    100 tablet    Take 1 tablet (25 mg) by mouth 3 times daily Take with unisom for nausea/vomiting    Supervision of normal pregnancy, Nausea and vomiting during pregnancy       * Notice:  This list has 2 medication(s) that are the same as other medications prescribed for you. Read the directions carefully, and ask your doctor or other care provider to review them with you.

## 2017-08-22 NOTE — NURSING NOTE
"Chief Complaint   Patient presents with     Back Pain     UTI       Initial /74  Pulse 89  Temp 98.9  F (37.2  C) (Oral)  Wt 186 lb 4.8 oz (84.5 kg)  LMP 05/20/2017  SpO2 97%  BMI 27.91 kg/m2 Estimated body mass index is 27.91 kg/(m^2) as calculated from the following:    Height as of 7/24/17: 5' 8.5\" (1.74 m).    Weight as of this encounter: 186 lb 4.8 oz (84.5 kg).  Medication Reconciliation: complete    "

## 2017-08-24 NOTE — PROGRESS NOTES
SUBJECTIVE:   Marta Asencio is a 31 year old female who  presents today for a possible UTI. Symptoms of dysuria, urgency and frequency have been going on for 2month(s).  Hematuria no.  still presentand mild and moderate.  There is no history of fever, chills, nausea or vomiting.   This patient does  have a history of urinary tract infections. Patient denies long duration, rigors, flank pain, temperature > 101 degrees F. and Vomiting, significant nausea or diarrhea or vaginal discharge.  She has been having some lower back pain, aches    Past Medical History:   Diagnosis Date     NO ACTIVE PROBLEMS      Allergies   Allergen Reactions     No Known Drug Allergies      Social History   Substance Use Topics     Smoking status: Former Smoker     Packs/day: 0.50     Smokeless tobacco: Never Used      Comment: 6-7 per day     Alcohol use Yes       ROS:   CONSTITUTIONAL:NEGATIVE for fever, chills, change in weight  INTEGUMENTARY/SKIN: NEGATIVE for worrisome rashes, moles or lesions  ENT/MOUTH: NEGATIVE for ear, mouth and throat problems  RESP:NEGATIVE for significant cough or SOB  CV: NEGATIVE for chest pain, palpitations or peripheral edema  GI: NEGATIVE for nausea, abdominal pain, heartburn, or change in bowel habits  : frequency of urination  MUSCULOSKELETAL: positive for lower back ache  NEURO: NEGATIVE for weakness, dizziness or paresthesias    OBJECTIVE:  /74  Pulse 89  Temp 98.9  F (37.2  C) (Oral)  Wt 186 lb 4.8 oz (84.5 kg)  LMP 05/20/2017  SpO2 97%  BMI 27.91 kg/m2  GENERAL APPEARANCE: healthy, alert and no distress  ABDOMEN:  soft, nontender, no HSM or masses and bowel sounds normal  BACK: No CVA tenderness  Extremities: no peripheral edema or tenderness, peripheral pulses normal  MS:  Positive for lower back ache, tenderness  SKIN: no suspicious lesions or rashes  NEURO: Normal strength and tone, sensory exam grossly normal,  normal speech and mentation    Results for orders placed or performed  in visit on 08/22/17   UA with Microscopic reflex to Culture   Result Value Ref Range    Color Urine Yellow     Appearance Urine Clear     Glucose Urine Negative NEG^Negative mg/dL    Bilirubin Urine Negative NEG^Negative    Ketones Urine Trace (A) NEG^Negative mg/dL    Specific Gravity Urine 1.020 1.003 - 1.035    pH Urine 6.5 5.0 - 7.0 pH    Protein Albumin Urine Negative NEG^Negative mg/dL    Urobilinogen Urine 0.2 0.2 - 1.0 EU/dL    Nitrite Urine Negative NEG^Negative    Blood Urine Negative NEG^Negative    Leukocyte Esterase Urine Negative NEG^Negative    Source Midstream Urine     WBC Urine O - 2 OTO2^O - 2 /HPF    RBC Urine O - 2 OTO2^O - 2 /HPF    Squamous Epithelial /LPF Urine Many (A) FEW^Few /LPF    Bacteria Urine Many (A) NEG^Negative /HPF    Mucous Urine Present (A) NEG^Negative /LPF       ASSESSMENT/PLAN:      ICD-10-CM    1. Urinary frequency R35.0 UA with Microscopic reflex to Culture     UROLOGY ADULT REFERRAL       Urine culture pending  Advised to follow up with urology  Drink plenty of fluids.  Prevention and treatment of UTI's discussed.Signs and symptoms of pyelonephritis mentioned.

## 2017-09-08 ENCOUNTER — PRENATAL OFFICE VISIT (OUTPATIENT)
Dept: OBGYN | Facility: CLINIC | Age: 32
End: 2017-09-08
Payer: COMMERCIAL

## 2017-09-08 VITALS
DIASTOLIC BLOOD PRESSURE: 67 MMHG | HEART RATE: 79 BPM | SYSTOLIC BLOOD PRESSURE: 101 MMHG | WEIGHT: 189 LBS | BODY MASS INDEX: 28.32 KG/M2 | TEMPERATURE: 97.3 F

## 2017-09-08 DIAGNOSIS — Z34.81 ENCOUNTER FOR SUPERVISION OF OTHER NORMAL PREGNANCY IN FIRST TRIMESTER: Primary | ICD-10-CM

## 2017-09-08 PROCEDURE — 36415 COLL VENOUS BLD VENIPUNCTURE: CPT | Performed by: OBSTETRICS & GYNECOLOGY

## 2017-09-08 PROCEDURE — 99000 SPECIMEN HANDLING OFFICE-LAB: CPT | Performed by: OBSTETRICS & GYNECOLOGY

## 2017-09-08 PROCEDURE — 99207 ZZC PRENATAL VISIT: CPT | Performed by: OBSTETRICS & GYNECOLOGY

## 2017-09-08 PROCEDURE — 82105 ALPHA-FETOPROTEIN SERUM: CPT | Mod: 90 | Performed by: OBSTETRICS & GYNECOLOGY

## 2017-09-08 NOTE — LETTER
[address] [address]  [phone]      September 8, 2017      Marta Asencio  [address]  [facility] [address]              To Whom It May Concern:    Marta Asencio is being seen in our clinic for prenatal care.  Her Estimated Date of Delivery: Feb 18, 2018.  Patient's last menstrual period was 05/20/2017..      Sincerely,          Carmen Ingram MD

## 2017-09-08 NOTE — PROGRESS NOTES
Feeling better now.  Has missed several days work due to fatigue, abdominal pain.  Had normal first trimester screen.  Will do MSAFP today. US ordered, RTC 4 weeks.  LT

## 2017-09-08 NOTE — LETTER
September 11, 2017      Marta Asencio  [address]  [facility] [address]        Dear ,    We are writing to inform you of your test results.    Your test results fall within the expected range(s) or remain unchanged from previous results.  Please continue with current treatment plan.    Resulted Orders   Alpha fetoprotein maternal screen   Result Value Ref Range    Pt Date of Birth 1985     Patient Weight 189 LBS 0 OZ     Due Date 2/18/2018     Determined by ULTRASOUND     Last Mens Period 5/20/2017     Twins ESPINOZA     Race of Mother      Diabetic NO     Fam History Of NTD NO     Prev Hx Chromosome Abnormality NO     Alpha Feto Protein 53 ng/mL    MoM for AFP 1.56     AFP Interpretation Normal       Comment:      (Note)           Maternal Serum Single Screen (AFP only)                     ** NORMAL SCREEN **  Risks             Pre-test      Post-test   Open NTD        1 in 900      1 in 4200  Assuming the patient information listed is correct, this  maternal serum screen is within normal limits.  The  interpretation is based on maternal age, gestational age,  maternal weight, the presence or absence of insulin  requiring maternal diabetes, maternal race, and number  of fetuses, if known.      Num Spec Sent First     Insulin Diabetic No     Fam History of NTD No     Prev Hx Chromosome abnormality No     Maternal Age 34.3 yr    Dating US     Estimated Due Date 18Feb18     Gest Age Exact 16.71 weeks    Patient Weight 189 lbs    Maternal Race Black     Num of Fetuses One       Comment:      (Note)  Performed by [facility],  [address] [place] [id number] [phone]  [email or web address], Miguel Ángel Hilton MD, Lab. Director         If you have any questions or concerns, please call the clinic at the number listed above.       Sincerely,        Carmen Ingram MD

## 2017-09-09 LAB
# FETUSES US: NORMAL
AFP ADJ MOM AMN: 1.56
AFP SERPL-MCNC: 53 NG/ML
AGE - REPORTED: 34.3 YR
DATING METHOD: NORMAL
DIABETIC AT CONCEPTION: NO
FAMILY MEMBER DISEASES HX: NO
FAMILY MEMBER DISEASES HX: NO
GA METHOD: NORMAL
GA: 16.71 WEEKS
HX OF HEREDITARY DISORDERS: NO
IDDM PATIENT QL: NO
INTEGRATED SCN PATIENT-IMP: NORMAL
LMP START DATE: NORMAL
PREV HX CHROMOSOME ABNORMALITY: NO
SPECIMEN DRAWN SERPL: NORMAL
TWINS: NORMAL

## 2017-10-12 ENCOUNTER — PRENATAL OFFICE VISIT (OUTPATIENT)
Dept: OBGYN | Facility: CLINIC | Age: 32
End: 2017-10-12
Attending: OBSTETRICS & GYNECOLOGY
Payer: COMMERCIAL

## 2017-10-12 ENCOUNTER — RADIANT APPOINTMENT (OUTPATIENT)
Dept: ULTRASOUND IMAGING | Facility: CLINIC | Age: 32
End: 2017-10-12
Attending: OBSTETRICS & GYNECOLOGY
Payer: COMMERCIAL

## 2017-10-12 VITALS
BODY MASS INDEX: 29.82 KG/M2 | TEMPERATURE: 97.8 F | HEART RATE: 73 BPM | WEIGHT: 199 LBS | SYSTOLIC BLOOD PRESSURE: 112 MMHG | DIASTOLIC BLOOD PRESSURE: 63 MMHG

## 2017-10-12 DIAGNOSIS — Z34.81 ENCOUNTER FOR SUPERVISION OF OTHER NORMAL PREGNANCY IN FIRST TRIMESTER: ICD-10-CM

## 2017-10-12 DIAGNOSIS — Z34.81 ENCOUNTER FOR SUPERVISION OF OTHER NORMAL PREGNANCY IN FIRST TRIMESTER: Primary | ICD-10-CM

## 2017-10-12 PROCEDURE — 76805 OB US >/= 14 WKS SNGL FETUS: CPT | Performed by: OBSTETRICS & GYNECOLOGY

## 2017-10-12 PROCEDURE — 99207 ZZC PRENATAL VISIT: CPT | Performed by: OBSTETRICS & GYNECOLOGY

## 2017-10-12 NOTE — PROGRESS NOTES
Having a girl, had normal fetal survey today.  MSAFP was normal.  Notes that her father passed away unexpectedly last week, concerned re: effect of stress on baby.  Reassured. Sleeping well.  RTC 4 weeks; 1 hr pc then.  LT

## 2017-10-12 NOTE — MR AVS SNAPSHOT
After Visit Summary   10/12/2017    Marta Asencio    MRN: [id number]           Patient Information     Date Of Birth          1985        Visit Information        Provider Department      10/12/2017 4:00 PM Carmen Ingram MD [facility]        Today's Diagnoses     Encounter for supervision of other normal pregnancy in 2nd  trimester    -  1       Follow-ups after your visit        Follow-up notes from your care team     Return in about 4 weeks (around 11/9/2017).      Your next 10 appointments already scheduled     Nov 03, 2017  2:45 PM CDT   LAB with RD LAB   [facility] ([facility])    [address] [place] [id number] [phone]           Patient must bring picture ID. Patient should be prepared to give a urine specimen  Please do not eat 10-12 hours before your appointment if you are coming in fasting for labs on lipids, cholesterol, or glucose (sugar). Pregnant women should follow their Care Team instructions. Water with medications is okay. Do not drink coffee or other fluids. If you have concerns about taking  your medications, please ask at office or if scheduling via [facility], send a message by clicking on Secure Messaging, Message Your Care Team.            Nov 03, 2017  3:00 PM CDT   ESTABLISHED PRENATAL with Carmen Ingram MD   [facility] ([facility])    [address] [place] [id number] [phone]              Who to contact     If you have questions or need follow up information about today's clinic visit or your schedule please contact [facility] directly at [phone].  Normal or non-critical lab and imaging results will be communicated to you by MyChart, letter or phone within 4 business days after the clinic has received the results. If you do not hear from us within 7 days, please contact the clinic through [facility] [employer] or  phone. If you have a critical or abnormal lab result, we will notify you by phone as soon as possible.  Submit refill requests through [employer] or call your pharmacy and they will forward the refill request to us. Please allow 3 business days for your refill to be completed.          Additional Information About Your Visit        Care EveryWhere ID     This is your Care EveryWhere ID. This could be used by other organizations to access your [facility] medical records  [id number]        Your Vitals Were     Pulse Temperature Last Period BMI (Body Mass Index)          73 97.8  F (36.6  C) (Oral) 05/20/2017 29.82 kg/m2         Blood Pressure from Last 3 Encounters:   10/12/17 112/63   09/08/17 101/67   08/22/17 118/74    Weight from Last 3 Encounters:   10/12/17 199 lb (90.3 kg)   09/08/17 189 lb (85.7 kg)   08/22/17 186 lb 4.8 oz (84.5 kg)              Today, you had the following     No orders found for display       Primary Care Provider Office Phone # Fax #    Carmen Ingram [phone]       [address]  [facility] [address]        Equal Access to Services     ROSALINDA SHEIKH : Hadii naima zhaoo Sode, waaxda luqadaha, qaybta kaalmada adeegyada, loc benson. So [facility] [phone].    ATENCIÓN: Si habla español, tiene a herman disposición servicios gratuitos de asistencia lingüística. [facility] [phone].    We comply with applicable federal civil rights laws and [place] laws. We do not discriminate on the basis of race, color, national origin, age, disability, sex, sexual orientation, or gender identity.            Thank you!     Thank you for choosing [facility]  for your care. Our goal is always to provide you with excellent care. Hearing back from our patients is one way we can continue to improve our services. Please take a few minutes to complete the written survey that you may receive in the mail after your visit with us. Thank  you!             Your Updated Medication List - Protect others around you: Learn how to safely use, store and throw away your medicines at www.disposemymeds.org.          This list is accurate as of: 10/12/17  4:26 PM.  Always use your most recent med list.                   Brand Name Dispense Instructions for use Diagnosis    doxylamine 25 MG Tabs tablet    UNISOM    28 each    Take 0.5 tablets (12.5 mg) by mouth 3 times daily Take with vitamin B6 for nausea/vomiting    Supervision of normal pregnancy, Nausea and vomiting during pregnancy       Prenatal Adult Gummy/DHA/FA 0.4-25 MG Chew     100 tablet    Take 1 chew tab by mouth daily    Supervision of normal pregnancy in second trimester       * prenatal multivitamin plus iron 27-0.8 MG Tabs per tablet      Take 1 tablet by mouth daily        * prenatal multivitamin plus iron 27-0.8 MG Tabs per tablet     100 tablet    Take 1 tablet by mouth daily    Supervision of normal pregnancy       pyridOXINE 25 MG tablet    vitamin B-6    100 tablet    Take 1 tablet (25 mg) by mouth 3 times daily Take with unisom for nausea/vomiting    Supervision of normal pregnancy, Nausea and vomiting during pregnancy       * Notice:  This list has 2 medication(s) that are the same as other medications prescribed for you. Read the directions carefully, and ask your doctor or other care provider to review them with you.

## 2017-11-03 ENCOUNTER — PRENATAL OFFICE VISIT (OUTPATIENT)
Dept: OBGYN | Facility: CLINIC | Age: 32
End: 2017-11-03
Payer: COMMERCIAL

## 2017-11-03 VITALS
HEART RATE: 89 BPM | BODY MASS INDEX: 30.57 KG/M2 | WEIGHT: 204 LBS | DIASTOLIC BLOOD PRESSURE: 68 MMHG | TEMPERATURE: 97.8 F | SYSTOLIC BLOOD PRESSURE: 114 MMHG

## 2017-11-03 DIAGNOSIS — Z23 NEED FOR PROPHYLACTIC VACCINATION AND INOCULATION AGAINST INFLUENZA: ICD-10-CM

## 2017-11-03 DIAGNOSIS — Z34.81 ENCOUNTER FOR SUPERVISION OF OTHER NORMAL PREGNANCY IN FIRST TRIMESTER: ICD-10-CM

## 2017-11-03 DIAGNOSIS — Z34.81 ENCOUNTER FOR SUPERVISION OF OTHER NORMAL PREGNANCY IN FIRST TRIMESTER: Primary | ICD-10-CM

## 2017-11-03 LAB
GLUCOSE 1H P 50 G GLC PO SERPL-MCNC: 75 MG/DL (ref 60–129)
HGB BLD-MCNC: 11.6 G/DL (ref 11.7–15.7)

## 2017-11-03 PROCEDURE — 82950 GLUCOSE TEST: CPT | Performed by: OBSTETRICS & GYNECOLOGY

## 2017-11-03 PROCEDURE — 90686 IIV4 VACC NO PRSV 0.5 ML IM: CPT | Performed by: OBSTETRICS & GYNECOLOGY

## 2017-11-03 PROCEDURE — 00000218 ZZHCL STATISTIC OBHBG - HEMOGLOBIN: Performed by: OBSTETRICS & GYNECOLOGY

## 2017-11-03 PROCEDURE — 99207 ZZC PRENATAL VISIT: CPT | Performed by: OBSTETRICS & GYNECOLOGY

## 2017-11-03 PROCEDURE — 90471 IMMUNIZATION ADMIN: CPT | Performed by: OBSTETRICS & GYNECOLOGY

## 2017-11-03 PROCEDURE — 36415 COLL VENOUS BLD VENIPUNCTURE: CPT | Performed by: OBSTETRICS & GYNECOLOGY

## 2017-11-03 NOTE — PROGRESS NOTES

## 2017-11-03 NOTE — MR AVS SNAPSHOT
"              After Visit Summary   11/3/2017    Marta Asencio    MRN: [id number]           Patient Information     Date Of Birth          1985        Visit Information        Provider Department      11/3/2017 3:00 PM Carmen Ingram MD [facility]        Today's Diagnoses     Encounter for supervision of other normal pregnancy in 2nd  trimester    -  1    Encounter for supervision of other normal pregnancy in first trimester        Need for prophylactic vaccination and inoculation against influenza           Follow-ups after your visit        Your next 10 appointments already scheduled     Dec 04, 2017  3:30 PM CST   ESTABLISHED PRENATAL with Carmen Ingram MD   [facility] ([facility])    [address] [place] [address] [phone]              Who to contact     If you have questions or need follow up information about today's clinic visit or your schedule please contact [facility] directly at [phone].  Normal or non-critical lab and imaging results will be communicated to you by MyChart, letter or phone within 4 business days after the clinic has received the results. If you do not hear from us within 7 days, please contact the clinic through [employer] or phone. If you have a critical or abnormal lab result, we will notify you by phone as soon as possible.  Submit refill requests through [employer] or call your pharmacy and they will forward the refill request to us. Please allow 3 business days for your refill to be completed.          Additional Information About Your Visit        MyChart Information     [employer] lets you send messages to your doctor, view your test results, renew your prescriptions, schedule appointments and more. To sign up, go to [email or web address] . Click on \"Log in\" on the left side of the screen, which will take you to the Welcome page. Then click on \"Sign up Now\" on the right " side of the page.     You will be asked to enter the access code listed below, as well as some personal information. Please follow the directions to create your username and password.     Your access code is: [email or web address]  Expires: 2018  3:34 PM     Your access code will  in 90 days. If you need help or a new code, please call your [facility] clinic or [phone].        Care EveryWhere ID     This is your Care EveryWhere ID. This could be used by other organizations to access your [facility] medical records  [id number]        Your Vitals Were     Pulse Temperature Last Period BMI (Body Mass Index)          89 97.8  F (36.6  C) (Oral) 2017 30.57 kg/m2         Blood Pressure from Last 3 Encounters:   17 114/68   10/12/17 112/63   17 101/67    Weight from Last 3 Encounters:   17 204 lb (92.5 kg)   10/12/17 199 lb (90.3 kg)   17 189 lb (85.7 kg)              We Performed the Following     FLU VAC, SPLIT VIRUS IM > 3 YO (QUADRIVALENT) [33841]     Glucose tolerance gest screen 1 hour     OB hemoglobin     Vaccine Administration, Initial [66813]        Primary Care Provider Office Phone # Fax #    Carmen Ingram [phone]       [address] [facility] [address]  [facility] [address]        Equal Access to Services     ROSALINDA SHEIKH : Hadii naima garcia hadasho Sode, waaxda luqadaha, qaybta kaalmada shaliniyada, loc orona . So [facility] [phone].    ATENCIÓN: Si habla español, tiene a herman disposición servicios gratuitos de asistencia lingüística. Llame al [phone].    We comply with applicable federal civil rights laws and [place] laws. We do not discriminate on the basis of race, color, national origin, age, disability, sex, sexual orientation, or gender identity.            Thank you!     Thank you for choosing [facility]  for your care. Our goal is always to provide you with excellent care. Hearing back from our patients is one  way we can continue to improve our services. Please take a few minutes to complete the written survey that you may receive in the mail after your visit with us. Thank you!             Your Updated Medication List - Protect others around you: Learn how to safely use, store and throw away your medicines at www.disposemymeds.org.          This list is accurate as of: 11/3/17  3:34 PM.  Always use your most recent med list.                   Brand Name Dispense Instructions for use Diagnosis    doxylamine 25 MG Tabs tablet    UNISOM    28 each    Take 0.5 tablets (12.5 mg) by mouth 3 times daily Take with vitamin B6 for nausea/vomiting    Supervision of normal pregnancy, Nausea and vomiting during pregnancy       Prenatal Adult Gummy/DHA/FA 0.4-25 MG Chew     100 tablet    Take 1 chew tab by mouth daily    Supervision of normal pregnancy in second trimester       * prenatal multivitamin plus iron 27-0.8 MG Tabs per tablet      Take 1 tablet by mouth daily        * prenatal multivitamin plus iron 27-0.8 MG Tabs per tablet     100 tablet    Take 1 tablet by mouth daily    Supervision of normal pregnancy       pyridOXINE 25 MG tablet    vitamin B-6    100 tablet    Take 1 tablet (25 mg) by mouth 3 times daily Take with unisom for nausea/vomiting    Supervision of normal pregnancy, Nausea and vomiting during pregnancy       * Notice:  This list has 2 medication(s) that are the same as other medications prescribed for you. Read the directions carefully, and ask your doctor or other care provider to review them with you.

## 2017-12-04 ENCOUNTER — PRENATAL OFFICE VISIT (OUTPATIENT)
Dept: OBGYN | Facility: CLINIC | Age: 32
End: 2017-12-04
Payer: COMMERCIAL

## 2017-12-04 VITALS
BODY MASS INDEX: 31.83 KG/M2 | SYSTOLIC BLOOD PRESSURE: 107 MMHG | DIASTOLIC BLOOD PRESSURE: 64 MMHG | TEMPERATURE: 97.8 F | WEIGHT: 212.4 LBS | HEART RATE: 78 BPM

## 2017-12-04 DIAGNOSIS — Z34.81 ENCOUNTER FOR SUPERVISION OF OTHER NORMAL PREGNANCY IN FIRST TRIMESTER: ICD-10-CM

## 2017-12-04 DIAGNOSIS — Z23 NEED FOR TDAP VACCINATION: Primary | ICD-10-CM

## 2017-12-04 PROCEDURE — 90715 TDAP VACCINE 7 YRS/> IM: CPT | Performed by: OBSTETRICS & GYNECOLOGY

## 2017-12-04 PROCEDURE — 90471 IMMUNIZATION ADMIN: CPT | Performed by: OBSTETRICS & GYNECOLOGY

## 2017-12-04 PROCEDURE — 99207 ZZC PRENATAL VISIT: CPT | Performed by: OBSTETRICS & GYNECOLOGY

## 2017-12-04 NOTE — PROGRESS NOTES
Doing well, baby active.  FH large for dates, will get sonogram with next appointment in 2 weeks.  Gaining too much weight, discussed diet.   LT

## 2017-12-04 NOTE — MR AVS SNAPSHOT
After Visit Summary   12/4/2017    Marta Asencio    MRN: [id number]           Patient Information     Date Of Birth          1985        Visit Information        Provider Department      12/4/2017 3:30 PM Carmen Ingram MD [facility]        Today's Diagnoses     Need for Tdap vaccination    -  1    Encounter for supervision of other normal pregnancy in 3rd  trimester           Follow-ups after your visit        Follow-up notes from your care team     Return in about 2 weeks (around 12/18/2017).      Your next 10 appointments already scheduled     Dec 18, 2017  5:30 PM CST   ESTABLISHED PRENATAL with Carmen Ingram MD   [facility] ([facility])    [address] [place] [id number] [phone]              Future tests that were ordered for you today     Open Future Orders        Priority Expected Expires Ordered    US OB Single Follow Up Repeat Routine 12/18/2017 12/4/2018 12/4/2017            Who to contact     If you have questions or need follow up information about today's clinic visit or your schedule please contact [facility] directly at [phone].  Normal or non-critical lab and imaging results will be communicated to you by MyChart, letter or phone within 4 business days after the clinic has received the results. If you do not hear from us within 7 days, please contact the clinic through [employer] or phone. If you have a critical or abnormal lab result, we will notify you by phone as soon as possible.  Submit refill requests through [employer] or call your pharmacy and they will forward the refill request to us. Please allow 3 business days for your refill to be completed.          Additional Information About Your Visit        [employer] Information     [employer] lets you send messages to your doctor, view your test results, renew your prescriptions, schedule appointments and more. To sign up, go  "to [email or web address] . Click on \"Log in\" on the left side of the screen, which will take you to the Welcome page. Then click on \"Sign up Now\" on the right side of the page.     You will be asked to enter the access code listed below, as well as some personal information. Please follow the directions to create your username and password.     Your access code is: [email or web address]  Expires: 2018  2:34 PM     Your access code will  in 90 days. If you need help or a new code, please call your [facility] clinic or [phone].        Care EveryWhere ID     This is your Care EveryWhere ID. This could be used by other organizations to access your [facility] medical records  [id number]        Your Vitals Were     Pulse Temperature Last Period BMI (Body Mass Index)          78 97.8  F (36.6  C) (Oral) 2017 31.83 kg/m2         Blood Pressure from Last 3 Encounters:   17 107/64   17 114/68   10/12/17 112/63    Weight from Last 3 Encounters:   17 212 lb 6.4 oz (96.3 kg)   17 204 lb (92.5 kg)   10/12/17 199 lb (90.3 kg)              We Performed the Following     TDAP VACCINE (ADACEL)     VACCINE ADMINISTRATION, INITIAL        Primary Care Provider Office Phone # Fax #    Carmen Ingram [phone]       [address] [address]        Equal Access to Services     [facility]: Hadii naima garcia hadasho Soyunali, waaxda luqadaha, qaybta kaalmada adeegyajayden, loc orona . So [facility] [phone].    ATENCIÓN: Si habla español, tiene a herman disposición servicios gratuitos de asistencia lingüística. Llame al [phone].    We comply with applicable federal civil rights laws and [place] laws. We do not discriminate on the basis of race, color, national origin, age, disability, sex, sexual orientation, or gender identity.            Thank you!     Thank you for choosing [facility]  for your care. Our goal is always to " provide you with excellent care. Hearing back from our patients is one way we can continue to improve our services. Please take a few minutes to complete the written survey that you may receive in the mail after your visit with us. Thank you!             Your Updated Medication List - Protect others around you: Learn how to safely use, store and throw away your medicines at www.disposemymeds.org.          This list is accurate as of: 12/4/17  5:23 PM.  Always use your most recent med list.                   Brand Name Dispense Instructions for use Diagnosis    doxylamine 25 MG Tabs tablet    UNISOM    28 each    Take 0.5 tablets (12.5 mg) by mouth 3 times daily Take with vitamin B6 for nausea/vomiting    Supervision of normal pregnancy, Nausea and vomiting during pregnancy       Prenatal Adult Gummy/DHA/FA 0.4-25 MG Chew     100 tablet    Take 1 chew tab by mouth daily    Supervision of normal pregnancy in second trimester       * prenatal multivitamin plus iron 27-0.8 MG Tabs per tablet      Take 1 tablet by mouth daily        * prenatal multivitamin plus iron 27-0.8 MG Tabs per tablet     100 tablet    Take 1 tablet by mouth daily    Supervision of normal pregnancy       pyridOXINE 25 MG tablet    vitamin B-6    100 tablet    Take 1 tablet (25 mg) by mouth 3 times daily Take with unisom for nausea/vomiting    Supervision of normal pregnancy, Nausea and vomiting during pregnancy       * Notice:  This list has 2 medication(s) that are the same as other medications prescribed for you. Read the directions carefully, and ask your doctor or other care provider to review them with you.

## 2017-12-11 ENCOUNTER — OFFICE VISIT (OUTPATIENT)
Dept: URGENT CARE | Facility: URGENT CARE | Age: 32
End: 2017-12-11
Payer: COMMERCIAL

## 2017-12-11 VITALS
TEMPERATURE: 98.1 F | RESPIRATION RATE: 16 BRPM | BODY MASS INDEX: 31.02 KG/M2 | WEIGHT: 207 LBS | SYSTOLIC BLOOD PRESSURE: 102 MMHG | HEART RATE: 79 BPM | DIASTOLIC BLOOD PRESSURE: 66 MMHG | OXYGEN SATURATION: 97 %

## 2017-12-11 DIAGNOSIS — Z34.81 ENCOUNTER FOR SUPERVISION OF OTHER NORMAL PREGNANCY IN FIRST TRIMESTER: Primary | ICD-10-CM

## 2017-12-11 DIAGNOSIS — M54.9 UPPER BACK PAIN ON LEFT SIDE: ICD-10-CM

## 2017-12-11 DIAGNOSIS — M62.830 BACK MUSCLE SPASM: ICD-10-CM

## 2017-12-11 PROCEDURE — 99214 OFFICE O/P EST MOD 30 MIN: CPT | Performed by: PHYSICIAN ASSISTANT

## 2017-12-11 RX ORDER — ACETAMINOPHEN 500 MG
1000 TABLET ORAL EVERY 6 HOURS PRN
Qty: 50 TABLET | Refills: 0 | Status: ON HOLD | OUTPATIENT
Start: 2017-12-11 | End: 2018-02-22

## 2017-12-11 RX ORDER — PNV NO.118/IRON FUMARATE/FA 29 MG-1 MG
TABLET,CHEWABLE ORAL
Refills: 3 | COMMUNITY
Start: 2017-11-14

## 2017-12-11 RX ORDER — CYCLOBENZAPRINE HCL 5 MG
5 TABLET ORAL 3 TIMES DAILY PRN
Qty: 21 TABLET | Refills: 0 | Status: SHIPPED | OUTPATIENT
Start: 2017-12-11

## 2017-12-11 NOTE — MR AVS SNAPSHOT
After Visit Summary   12/11/2017    Marta Asencio    MRN: [id number]           Patient Information     Date Of Birth          1985        Visit Information        Provider Department      12/11/2017 12:45 PM Chaim Ortega PA-C [facility]        Today's Diagnoses     Encounter for supervision of other normal pregnancy in 3rd  trimester    -  1    Upper back pain on left side        Back muscle spasm           Follow-ups after your visit        Your next 10 appointments already scheduled     Dec 18, 2017  4:40 PM CST   (Arrive by 4:25 PM)   US OB SINGLE FOLLOW UP REPEAT with [email or web address]   [facility] ([facility])    [address] [place] [address]  [address]  [facility] [address] [phone]           Please bring a list of your medicines (including vitamins, minerals and over-the-counter drugs). Also, tell your doctor about any allergies you may have. Wear comfortable clothes and leave your valuables at home.  If you re less than 20 weeks drink four 8-ounce glasses of fluid an hour before your exam. If you need to empty your bladder before your exam, try to release only a little urine. Then, drink another glass of fluid.  You may have up to two family members in the exam room. If you bring a small child, an adult must be there to care for him or her.  Please call the Imaging Department at your exam site with any questions.            Dec 18, 2017  5:30 PM CST   ESTABLISHED PRENATAL with Carmen Ingram MD   [facility] ([facility])    [address] [place] [address]  [address]  [facility] [address] [phone]              Who to contact     If you have questions or need follow up information about today's clinic visit or your schedule please contact [facility] directly at [phone].  Normal or non-critical lab and imaging results will be communicated to you by MyChart, letter or  "phone within 4 business days after the clinic has received the results. If you do not hear from us within 7 days, please contact the clinic through [employer] or phone. If you have a critical or abnormal lab result, we will notify you by phone as soon as possible.  Submit refill requests through [employer] or call your pharmacy and they will forward the refill request to us. Please allow 3 business days for your refill to be completed.          Additional Information About Your Visit        [employer] Information     [employer] lets you send messages to your doctor, view your test results, renew your prescriptions, schedule appointments and more. To sign up, go to [email or web address] . Click on \"Log in\" on the left side of the screen, which will take you to the Welcome page. Then click on \"Sign up Now\" on the right side of the page.     You will be asked to enter the access code listed below, as well as some personal information. Please follow the directions to create your username and password.     Your access code is: [email or web address]  Expires: 2018  2:34 PM     Your access code will  in 90 days. If you need help or a new code, please call your [place] clinic or [phone].        Care EveryWhere ID     This is your Care EveryWhere ID. This could be used by other organizations to access your [place] medical records  [id number]        Your Vitals Were     Pulse Temperature Respirations Last Period Pulse Oximetry BMI (Body Mass Index)    79 98.1  F (36.7  C) (Oral) 16 2017 97% 31.02 kg/m2       Blood Pressure from Last 3 Encounters:   17 102/66   17 107/64   17 114/68    Weight from Last 3 Encounters:   17 207 lb (93.9 kg)   17 212 lb 6.4 oz (96.3 kg)   17 204 lb (92.5 kg)              Today, you had the following     No orders found for display         Today's Medication Changes          These changes are accurate as of: 17  1:49 PM.  If you have any questions, " ask your nurse or doctor.               Start taking these medicines.        Dose/Directions    acetaminophen 500 MG tablet   Commonly known as:  TYLENOL   Used for:  Upper back pain on left side, Back muscle spasm   Started by:  Chaim Ortega PA-C        Dose:  1000 mg   Take 2 tablets (1,000 mg) by mouth every 6 hours as needed for mild pain   Quantity:  50 tablet   Refills:  0       cyclobenzaprine 5 MG tablet   Commonly known as:  FLEXERIL   Used for:  Back muscle spasm   Started by:  Chaim Ortega PA-C        Dose:  5 mg   Take 1 tablet (5 mg) by mouth 3 times daily as needed for muscle spasms   Quantity:  21 tablet   Refills:  0            Where to get your medicines      These medications were sent to [facility] [id number] [place] [address] [address] AT [address]  [address], [facility] [address]    Hours:  24-hours Phone:  [phone]     acetaminophen 500 MG tablet    cyclobenzaprine 5 MG tablet                Primary Care Provider Office Phone # Fax #    Carmen Ingram [phone]       [address] [facility] [address]  [facility] [address]        Equal Access to Services     BECCA SHEIKH AH: Hadii aad ku hadasho Soomaali, waaxda luqadaha, qaybta kaalmada adeegyada, waxay idiin hayemren shalini benson. So [facility] [phone].    ATENCIÓN: Si habla español, tiene a herman disposición servicios gratuitos de asistencia lingüística. [facility] [phone].    We comply with applicable federal civil rights laws and [place] laws. We do not discriminate on the basis of race, color, national origin, age, disability, sex, sexual orientation, or gender identity.            Thank you!     Thank you for choosing [facility]  for your care. Our goal is always to provide you with excellent care. Hearing back from our patients is one way we can continue to improve our services. Please take a few minutes to complete the written survey that you  may receive in the mail after your visit with us. Thank you!             Your Updated Medication List - Protect others around you: Learn how to safely use, store and throw away your medicines at www.disposemymeds.org.          This list is accurate as of: 12/11/17  1:49 PM.  Always use your most recent med list.                   Brand Name Dispense Instructions for use Diagnosis    acetaminophen 500 MG tablet    TYLENOL    50 tablet    Take 2 tablets (1,000 mg) by mouth every 6 hours as needed for mild pain    Upper back pain on left side, Back muscle spasm       cyclobenzaprine 5 MG tablet    FLEXERIL    21 tablet    Take 1 tablet (5 mg) by mouth 3 times daily as needed for muscle spasms    Back muscle spasm       doxylamine 25 MG Tabs tablet    UNISOM    28 each    Take 0.5 tablets (12.5 mg) by mouth 3 times daily Take with vitamin B6 for nausea/vomiting    Supervision of normal pregnancy, Nausea and vomiting during pregnancy       Prenatal Adult Gummy/DHA/FA 0.4-25 MG Chew     100 tablet    Take 1 chew tab by mouth daily    Supervision of normal pregnancy in second trimester       * prenatal multivitamin plus iron 27-0.8 MG Tabs per tablet      Take 1 tablet by mouth daily        * prenatal multivitamin plus iron 27-0.8 MG Tabs per tablet     100 tablet    Take 1 tablet by mouth daily    Supervision of normal pregnancy       * PRENATAL 19 Chew      CHEW AND SWALLOW 1 T PO D        pyridOXINE 25 MG tablet    vitamin B-6    100 tablet    Take 1 tablet (25 mg) by mouth 3 times daily Take with unisom for nausea/vomiting    Supervision of normal pregnancy, Nausea and vomiting during pregnancy       * Notice:  This list has 3 medication(s) that are the same as other medications prescribed for you. Read the directions carefully, and ask your doctor or other care provider to review them with you.

## 2017-12-11 NOTE — NURSING NOTE
"Chief Complaint   Patient presents with     Back Pain     pain lt scapula area started yesterday       Initial /66 (Cuff Size: Adult Regular)  Pulse 79  Temp 98.1  F (36.7  C) (Oral)  Resp 16  Wt 207 lb (93.9 kg)  LMP 05/20/2017  SpO2 97%  BMI 31.02 kg/m2 Estimated body mass index is 31.02 kg/(m^2) as calculated from the following:    Height as of 7/24/17: 5' 8.5\" (1.74 m).    Weight as of this encounter: 207 lb (93.9 kg).  Medication Reconciliation: complete Jennyfer TEIXEIRA    "

## 2017-12-11 NOTE — LETTER
[facility]  [address] [address]  [phone]      December 11, 2017    RE:  Marta Asencio                                                                                                                                                       [address]  [facility] [address]            To whom it may concern:    Marta Asencio was seen in the [facility] today for a medical issue.        Sincerely,        Marquita Rothman RN/Chaim Ortega, PAC    [place] [facility]

## 2017-12-18 ENCOUNTER — PRENATAL OFFICE VISIT (OUTPATIENT)
Dept: OBGYN | Facility: CLINIC | Age: 32
End: 2017-12-18
Payer: COMMERCIAL

## 2017-12-18 ENCOUNTER — RADIANT APPOINTMENT (OUTPATIENT)
Dept: ULTRASOUND IMAGING | Facility: CLINIC | Age: 32
End: 2017-12-18
Attending: OBSTETRICS & GYNECOLOGY
Payer: COMMERCIAL

## 2017-12-18 VITALS
WEIGHT: 208 LBS | TEMPERATURE: 98.2 F | DIASTOLIC BLOOD PRESSURE: 72 MMHG | BODY MASS INDEX: 31.17 KG/M2 | HEART RATE: 93 BPM | SYSTOLIC BLOOD PRESSURE: 133 MMHG

## 2017-12-18 DIAGNOSIS — Z34.81 ENCOUNTER FOR SUPERVISION OF OTHER NORMAL PREGNANCY IN FIRST TRIMESTER: ICD-10-CM

## 2017-12-18 PROCEDURE — 76816 OB US FOLLOW-UP PER FETUS: CPT | Performed by: OBSTETRICS & GYNECOLOGY

## 2017-12-18 PROCEDURE — 99207 ZZC PRENATAL VISIT: CPT | Performed by: OBSTETRICS & GYNECOLOGY

## 2017-12-18 NOTE — PROGRESS NOTES
EFW 4 lb 6 oz.  Babies on Marta's side of family are large.  Doing better with diet.  Episode of diaphoresis, needs to hydrate and eat more in AM.  RTC 2 weeks.  US in 4 weeks.  LT

## 2017-12-18 NOTE — PROGRESS NOTES
SUBJECTIVE  HPI: Marta Asencio is a 32 year old female who presents for evaluation of back pain, spasms of left upper back  Symptoms began few days  ago, have been onset gradual and are unchanged.  Pain is located in the middle of back left region, with radiation to does not radiate, and are at worst a 5 on a scale of 1-10.  Recent injury:turning/bending   Personal hx of back pain is recurrent self limited episodes of low back pain in the past.  Pain is exacerbated by: movements and twisting.  Pain is relieved by: rest   Associated sx include: spasm and back pain.  Red flag symptoms: negative for SOB, chest pain, chest congestion.    Past Medical History:   Diagnosis Date     NO ACTIVE PROBLEMS      Allergies   Allergen Reactions     No Known Drug Allergies      Social History   Substance Use Topics     Smoking status: Former Smoker     Packs/day: 0.50     Smokeless tobacco: Never Used      Comment: 6-7 per day     Alcohol use Yes       ROS:  CONSTITUTIONAL:NEGATIVE for fever, chills, change in weight  INTEGUMENTARY/SKIN: NEGATIVE for worrisome rashes, moles or lesions  ENT/MOUTH: NEGATIVE for ear, mouth and throat problems  RESP:NEGATIVE for significant cough or SOB  CV: NEGATIVE for chest pain, palpitations or peripheral edema  GI: NEGATIVE for nausea, abdominal pain, heartburn, or change in bowel habits  MUSCULOSKELETAL: POSITIVE  for left upper back tenderness, spasms, tightness in muscles  NEURO: NEGATIVE for weakness, dizziness or paresthesias    OBJECTIVE:  /66 (Cuff Size: Adult Regular)  Pulse 79  Temp 98.1  F (36.7  C) (Oral)  Resp 16  Wt 207 lb (93.9 kg)  LMP 05/20/2017  SpO2 97%  BMI 31.02 kg/m2  Back examination: Back symmetric, no curvature. ROM normal. No CVA tenderness., positive findings: paraspinal muscle spasm, tenderness to palpation   STRAIGHT leg raise test: negative  GENERAL APPEARANCE: healthy, alert and no distress  RESP: lungs clear to auscultation - no rales, rhonchi or  wheezes  CV: regular rates and rhythm, normal S1 S2, no murmur noted  ABDOMEN:  soft, nontender, no HSM or masses and bowel sounds normal  NEURO: Normal strength and tone with no weakness or sensory deficit noted, reflexes normal   Extremities: no peripheral edema or tenderness, peripheral pulses normal  MS:  Positive for left upper back tenderness, spasms  SKIN: no suspicious lesions or rashes    ASSESSMENT/IMPRESSION/PLAN      ICD-10-CM    1. Encounter for supervision of other normal pregnancy in 3rd  trimester Z34.81 Monitor symptoms closely and avoid medications as needed   2. Upper back pain on left side M54.9 acetaminophen (TYLENOL) 500 MG tablet   3. Back muscle spasm M62.830 acetaminophen (TYLENOL) 500 MG tablet     cyclobenzaprine (FLEXERIL) 5 MG tablet     EDUCATION      1.  Continue stretching and strengthening exercises.       2.  Continue prn heat or ice application.    Orders Placed This Encounter     Prenatal Vit-Fe Fumarate-FA (PRENATAL 19) CHEW     Follow up as needed

## 2017-12-18 NOTE — MR AVS SNAPSHOT
"              After Visit Summary   12/18/2017    Marta Asencio    MRN: [id number]           Patient Information     Date Of Birth          1985        Visit Information        Provider Department      12/18/2017 5:30 PM Carmen Ingram MD [facility]        Today's Diagnoses     Encounter for supervision of other normal pregnancy in first trimester           Follow-ups after your visit        Follow-up notes from your care team     Return in about 2 weeks (around 1/1/2018).      Your next 10 appointments already scheduled     Dec 18, 2017  5:30 PM CST   ESTABLISHED PRENATAL with Carmen Ingram MD   [facility] ([facility])    [address] [place] [address] [phone]              Who to contact     If you have questions or need follow up information about today's clinic visit or your schedule please contact [facility] directly at [phone].  Normal or non-critical lab and imaging results will be communicated to you by MyChart, letter or phone within 4 business days after the clinic has received the results. If you do not hear from us within 7 days, please contact the clinic through MyChart or phone. If you have a critical or abnormal lab result, we will notify you by phone as soon as possible.  Submit refill requests through "[employer] or call your pharmacy and they will forward the refill request to us. Please allow 3 business days for your refill to be completed.          Additional Information About Your Visit        [employer] Information     "[employer] lets you send messages to your doctor, view your test results, renew your prescriptions, schedule appointments and more. To sign up, go to [email or web address] . Click on \"Log in\" on the left side of the screen, which will take you to the Welcome page. Then click on \"Sign up Now\" on the right side of the page.     You will be asked to enter the access code " listed below, as well as some personal information. Please follow the directions to create your username and password.     Your access code is: [email or web address]  Expires: 2018  2:34 PM     Your access code will  in 90 days. If you need help or a new code, please call your [place] clinic or [phone].        Care EveryWhere ID     This is your Care EveryWhere ID. This could be used by other organizations to access your [place] medical records  [id number]        Your Vitals Were     Pulse Temperature Last Period BMI (Body Mass Index)          93 98.2  F (36.8  C) (Oral) 2017 31.17 kg/m2         Blood Pressure from Last 3 Encounters:   17 133/72   17 102/66   17 107/64    Weight from Last 3 Encounters:   17 208 lb (94.3 kg)   17 207 lb (93.9 kg)   17 212 lb 6.4 oz (96.3 kg)              Today, you had the following     No orders found for display       Primary Care Provider Office Phone # Fax #    Carmen Ingram [phone]       [address] [facility] [address]  [facility] [address]        Equal Access to Services     BECCA SHEIKH : Hadii naima ku hadasho Soomaali, waaxda luqadaha, qaybta kaalmada adeegyada, loc orona . So [facility] [phone].    ATENCIÓN: Si habla español, tiene a herman disposición servicios gratuitos de asistencia lingüística. Llame al [phone].    We comply with applicable federal civil rights laws and [place] laws. We do not discriminate on the basis of race, color, national origin, age, disability, sex, sexual orientation, or gender identity.            Thank you!     Thank you for choosing [facility]  for your care. Our goal is always to provide you with excellent care. Hearing back from our patients is one way we can continue to improve our services. Please take a few minutes to complete the written survey that you may receive in the mail after your visit with us. Thank you!              Your Updated Medication List - Protect others around you: Learn how to safely use, store and throw away your medicines at www.disposemymeds.org.          This list is accurate as of: 12/18/17  5:12 PM.  Always use your most recent med list.                   Brand Name Dispense Instructions for use Diagnosis    acetaminophen 500 MG tablet    TYLENOL    50 tablet    Take 2 tablets (1,000 mg) by mouth every 6 hours as needed for mild pain    Upper back pain on left side, Back muscle spasm       cyclobenzaprine 5 MG tablet    FLEXERIL    21 tablet    Take 1 tablet (5 mg) by mouth 3 times daily as needed for muscle spasms    Back muscle spasm       doxylamine 25 MG Tabs tablet    UNISOM    28 each    Take 0.5 tablets (12.5 mg) by mouth 3 times daily Take with vitamin B6 for nausea/vomiting    Supervision of normal pregnancy, Nausea and vomiting during pregnancy       Prenatal Adult Gummy/DHA/FA 0.4-25 MG Chew     100 tablet    Take 1 chew tab by mouth daily    Supervision of normal pregnancy in second trimester       * prenatal multivitamin plus iron 27-0.8 MG Tabs per tablet      Take 1 tablet by mouth daily        * prenatal multivitamin plus iron 27-0.8 MG Tabs per tablet     100 tablet    Take 1 tablet by mouth daily    Supervision of normal pregnancy       * PRENATAL 19 Chew      CHEW AND SWALLOW 1 T PO D        pyridOXINE 25 MG tablet    vitamin B-6    100 tablet    Take 1 tablet (25 mg) by mouth 3 times daily Take with unisom for nausea/vomiting    Supervision of normal pregnancy, Nausea and vomiting during pregnancy       * Notice:  This list has 3 medication(s) that are the same as other medications prescribed for you. Read the directions carefully, and ask your doctor or other care provider to review them with you.

## 2017-12-29 ENCOUNTER — OFFICE VISIT (OUTPATIENT)
Dept: URGENT CARE | Facility: URGENT CARE | Age: 32
End: 2017-12-29
Payer: COMMERCIAL

## 2017-12-29 VITALS
DIASTOLIC BLOOD PRESSURE: 66 MMHG | RESPIRATION RATE: 20 BRPM | SYSTOLIC BLOOD PRESSURE: 102 MMHG | WEIGHT: 211 LBS | BODY MASS INDEX: 31.62 KG/M2 | HEART RATE: 76 BPM | TEMPERATURE: 97.7 F

## 2017-12-29 DIAGNOSIS — R30.0 DYSURIA: Primary | ICD-10-CM

## 2017-12-29 DIAGNOSIS — R35.0 URINARY FREQUENCY: ICD-10-CM

## 2017-12-29 LAB
ALBUMIN UR-MCNC: NEGATIVE MG/DL
AMORPH CRY #/AREA URNS HPF: ABNORMAL /HPF
APPEARANCE UR: ABNORMAL
BACTERIA #/AREA URNS HPF: ABNORMAL /HPF
BILIRUB UR QL STRIP: NEGATIVE
COLOR UR AUTO: YELLOW
GLUCOSE UR STRIP-MCNC: NEGATIVE MG/DL
HGB UR QL STRIP: NEGATIVE
KETONES UR STRIP-MCNC: NEGATIVE MG/DL
LEUKOCYTE ESTERASE UR QL STRIP: NEGATIVE
NITRATE UR QL: NEGATIVE
NON-SQ EPI CELLS #/AREA URNS LPF: ABNORMAL /LPF
PH UR STRIP: 6 PH (ref 5–7)
RBC #/AREA URNS AUTO: ABNORMAL /HPF
SOURCE: ABNORMAL
SP GR UR STRIP: >1.03 (ref 1–1.03)
UROBILINOGEN UR STRIP-ACNC: 0.2 EU/DL (ref 0.2–1)
WBC #/AREA URNS AUTO: ABNORMAL /HPF

## 2017-12-29 PROCEDURE — 81001 URINALYSIS AUTO W/SCOPE: CPT | Performed by: PHYSICIAN ASSISTANT

## 2017-12-29 PROCEDURE — 99213 OFFICE O/P EST LOW 20 MIN: CPT | Performed by: PHYSICIAN ASSISTANT

## 2017-12-29 PROCEDURE — 87086 URINE CULTURE/COLONY COUNT: CPT | Performed by: PHYSICIAN ASSISTANT

## 2017-12-29 RX ORDER — NITROFURANTOIN 25; 75 MG/1; MG/1
100 CAPSULE ORAL 2 TIMES DAILY
Qty: 14 CAPSULE | Refills: 0 | Status: SHIPPED | OUTPATIENT
Start: 2017-12-29 | End: 2018-02-19

## 2017-12-29 NOTE — LETTER
[facility]  [address] [address]  [phone]      December 29, 2017    RE:  Marta Asencio                                                                                                                                                       [address]  [facility] [address]            To whom it may concern:    Marta Asencio was seen in the urgent care today.       Sincerely,        Chaim Ortega [facility]

## 2017-12-29 NOTE — MR AVS SNAPSHOT
"              After Visit Summary   12/29/2017    Marta Asencio    MRN: [id number]           Patient Information     Date Of Birth          1985        Visit Information        Provider Department      12/29/2017 3:35 PM Chaim Ortega PA-C [facility]        Today's Diagnoses     Dysuria    -  1    Urinary frequency           Follow-ups after your visit        Your next 10 appointments already scheduled     Jan 02, 2018  4:45 PM CST   ESTABLISHED PRENATAL with Carmen Ingram MD   [facility]    [address] [place] [address] [phone]              Who to contact     If you have questions or need follow up information about today's clinic visit or your schedule please contact [facility] [place] directly at [phone].  Normal or non-critical lab and imaging results will be communicated to you by MyChart, letter or phone within 4 business days after the clinic has received the results. If you do not hear from us within 7 days, please contact the clinic through MyChart or phone. If you have a critical or abnormal lab result, we will notify you by phone as soon as possible.  Submit refill requests through [employer] or call your pharmacy and they will forward the refill request to us. Please allow 3 business days for your refill to be completed.          Additional Information About Your Visit        MyChart Information     [facility] lets you send messages to your doctor, view your test results, renew your prescriptions, schedule appointments and more. To sign up, go to [email or web address] . Click on \"Log in\" on the left side of the screen, which will take you to the Welcome page. Then click on \"Sign up Now\" on the right side of the page.     You will be asked to enter the access code listed below, as well as some personal information. Please follow the directions to create your " username and password.     Your access code is: [email or web address]  Expires: 2018  2:34 PM     Your access code will  in 90 days. If you need help or a new code, please call your [place] clinic or [phone].        Care EveryWhere ID     This is your Care EveryWhere ID. This could be used by other organizations to access your [place] medical records  [id number]        Your Vitals Were     Pulse Temperature Respirations Last Period BMI (Body Mass Index)       76 97.7  F (36.5  C) (Oral) 20 2017 31.62 kg/m2        Blood Pressure from Last 3 Encounters:   17 102/66   17 133/72   17 102/66    Weight from Last 3 Encounters:   17 211 lb (95.7 kg)   17 208 lb (94.3 kg)   17 207 lb (93.9 kg)              We Performed the Following     UA with Microscopic reflex to Culture     Urine Culture Aerobic Bacterial          Today's Medication Changes          These changes are accurate as of: 17 11:59 PM.  If you have any questions, ask your nurse or doctor.               Start taking these medicines.        Dose/Directions    * nitroFURantoin (macrocrystal-monohydrate) 100 MG capsule   Commonly known as:  MACROBID   Used for:  Dysuria, Urinary frequency   Started by:  Chaim Ortega PA-C        Dose:  100 mg   Take 1 capsule (100 mg) by mouth 2 times daily   Quantity:  14 capsule   Refills:  0       * nitroFURantoin (macrocrystal-monohydrate) 100 MG capsule   Commonly known as:  MACROBID   Used for:  Dysuria, Urinary frequency   Started by:  Chaim Ortega PA-C        Dose:  100 mg   Take 1 capsule (100 mg) by mouth 2 times daily   Quantity:  14 capsule   Refills:  0       * Notice:  This list has 2 medication(s) that are the same as other medications prescribed for you. Read the directions carefully, and ask your doctor or other care provider to review them with you.         Where to get your medicines      These medications were sent to [id number] -  [place], [place] - [address] AT [facility] [address], [facility] [address]    Hours:  24-hours Phone:  [phone]     nitroFURantoin (macrocrystal-monohydrate) 100 MG capsule    nitroFURantoin (macrocrystal-monohydrate) 100 MG capsule                Primary Care Provider Office Phone # Fax #    Carmen Ingram [phone]       [address] [facility] [address]  [facility] [address]        Equal Access to Services     Kern ValleyGILLIAN : Hadii aad ku hadasho Soomaali, waaxda luqadaha, qaybta kaalmada adeegyada, waxay idiin hayaan adeeg reema orona . So [facility] [place] [phone].    ATENCIÓN: Si habla español, tiene a herman disposición servicios gratuitos de asistencia lingüística. [facility] [phone].    We comply with applicable federal civil rights laws and [place] laws. We do not discriminate on the basis of race, color, national origin, age, disability, sex, sexual orientation, or gender identity.            Thank you!     Thank you for choosing [facility]  for your care. Our goal is always to provide you with excellent care. Hearing back from our patients is one way we can continue to improve our services. Please take a few minutes to complete the written survey that you may receive in the mail after your visit with us. Thank you!             Your Updated Medication List - Protect others around you: Learn how to safely use, store and throw away your medicines at www.disposemymeds.org.          This list is accurate as of: 12/29/17 11:59 PM.  Always use your most recent med list.                   Brand Name Dispense Instructions for use Diagnosis    acetaminophen 500 MG tablet    TYLENOL    50 tablet    Take 2 tablets (1,000 mg) by mouth every 6 hours as needed for mild pain    Upper back pain on left side, Back muscle spasm       cyclobenzaprine 5 MG tablet    FLEXERIL    21 tablet    Take 1 tablet (5 mg) by mouth 3 times daily as needed for muscle  spasms    Back muscle spasm       doxylamine 25 MG Tabs tablet    UNISOM    28 each    Take 0.5 tablets (12.5 mg) by mouth 3 times daily Take with vitamin B6 for nausea/vomiting    Supervision of normal pregnancy, Nausea and vomiting during pregnancy       * nitroFURantoin (macrocrystal-monohydrate) 100 MG capsule    MACROBID    14 capsule    Take 1 capsule (100 mg) by mouth 2 times daily    Dysuria, Urinary frequency       * nitroFURantoin (macrocrystal-monohydrate) 100 MG capsule    MACROBID    14 capsule    Take 1 capsule (100 mg) by mouth 2 times daily    Dysuria, Urinary frequency       Prenatal Adult Gummy/DHA/FA 0.4-25 MG Chew     100 tablet    Take 1 chew tab by mouth daily    Supervision of normal pregnancy in second trimester       * prenatal multivitamin plus iron 27-0.8 MG Tabs per tablet      Take 1 tablet by mouth daily        * prenatal multivitamin plus iron 27-0.8 MG Tabs per tablet     100 tablet    Take 1 tablet by mouth daily    Supervision of normal pregnancy       * PRENATAL 19 Chew      CHEW AND SWALLOW 1 T PO D        pyridOXINE 25 MG tablet    vitamin B-6    100 tablet    Take 1 tablet (25 mg) by mouth 3 times daily Take with unisom for nausea/vomiting    Supervision of normal pregnancy, Nausea and vomiting during pregnancy       * Notice:  This list has 5 medication(s) that are the same as other medications prescribed for you. Read the directions carefully, and ask your doctor or other care provider to review them with you.

## 2017-12-29 NOTE — NURSING NOTE
"Chief Complaint   Patient presents with     Urinary Problem     urinary frequency and low back pain     Back Pain       Initial /66 (Cuff Size: Adult Regular)  Pulse 76  Temp 97.7  F (36.5  C) (Oral)  Resp 20  Wt 211 lb (95.7 kg)  LMP 05/20/2017  BMI 31.62 kg/m2 Estimated body mass index is 31.62 kg/(m^2) as calculated from the following:    Height as of 7/24/17: 5' 8.5\" (1.74 m).    Weight as of this encounter: 211 lb (95.7 kg).  Medication Reconciliation: complete Jennyfer TEIXEIRA    "

## 2017-12-29 NOTE — LETTER
[facility]  [address] [address]  [phone]      December 29, 2017    RE:  Marta Asencio                                                                                                                                                       [address]  [place] [address]            To whom it may concern:    Marta Asencio was seen in the urgent care today for an illness.      Sincerely,        Chaim Ortega [facility]

## 2017-12-30 LAB
BACTERIA SPEC CULT: NORMAL
SPECIMEN SOURCE: NORMAL

## 2017-12-30 NOTE — PROGRESS NOTES
SUBJECTIVE:   Marta Asencio is a 32 year old female who  presents today for a possible UTI. Symptoms of dysuria, urgency and frequency have been going on for 3day(s).  Hematuria yes .  sudden onsetand mild.  There is no history of fever, chills, nausea or vomiting.   This patient does  have a history of urinary tract infections. Patient denies long duration, rigors, flank pain, temperature > 101 degrees F. and Vomiting, significant nausea or diarrhea or vaginal discharge     Past Medical History:   Diagnosis Date     NO ACTIVE PROBLEMS      Allergies   Allergen Reactions     No Known Drug Allergies      Social History   Substance Use Topics     Smoking status: Former Smoker     Packs/day: 0.50     Smokeless tobacco: Never Used      Comment: 6-7 per day     Alcohol use Yes       ROS:   CONSTITUTIONAL:NEGATIVE for fever, chills, change in weight  INTEGUMENTARY/SKIN: NEGATIVE for worrisome rashes, moles or lesions  GI: NEGATIVE for nausea, abdominal pain, heartburn, or change in bowel habits  : dysuria and frequency   MUSCULOSKELETAL: NEGATIVE for significant arthralgias or myalgia  NEURO: NEGATIVE for weakness, dizziness or paresthesias    OBJECTIVE:  /66 (Cuff Size: Adult Regular)  Pulse 76  Temp 97.7  F (36.5  C) (Oral)  Resp 20  Wt 211 lb (95.7 kg)  LMP 05/20/2017  BMI 31.62 kg/m2  GENERAL APPEARANCE: healthy, alert and no distress  ABDOMEN:  soft, nontender, no HSM or masses and bowel sounds normal  BACK: No CVA tenderness  SKIN: no suspicious lesions or rashes    Results for orders placed or performed in visit on 12/29/17   UA with Microscopic reflex to Culture   Result Value Ref Range    Color Urine Yellow     Appearance Urine Slightly Cloudy     Glucose Urine Negative NEG^Negative mg/dL    Bilirubin Urine Negative NEG^Negative    Ketones Urine Negative NEG^Negative mg/dL    Specific Gravity Urine >1.030 1.003 - 1.035    pH Urine 6.0 5.0 - 7.0 pH    Protein Albumin Urine Negative NEG^Negative  mg/dL    Urobilinogen Urine 0.2 0.2 - 1.0 EU/dL    Nitrite Urine Negative NEG^Negative    Blood Urine Negative NEG^Negative    Leukocyte Esterase Urine Negative NEG^Negative    Source Midstream Urine     WBC Urine 5-10 (A) OTO2^O - 2 /HPF    RBC Urine O - 2 OTO2^O - 2 /HPF    Squamous Epithelial /LPF Urine Moderate (A) FEW^Few /LPF    Bacteria Urine Moderate (A) NEG^Negative /HPF    Amorphous Crystals Many (A) NEG^Negative /HPF       ASSESSMENT:   Lower, uncomplicated urinary tract infection.    PLAN:  Orders Placed This Encounter     UA with Microscopic reflex to Culture     nitroFURantoin, macrocrystal-monohydrate, (MACROBID) 100 MG capsule       Urine culture pending  Drink plenty of fluids.  Prevention and treatment of UTI's discussed.Signs and symptoms of pyelonephritis mentioned.  Follow up with primary care physician if not improving

## 2018-01-03 ENCOUNTER — TELEPHONE (OUTPATIENT)
Dept: URGENT CARE | Facility: URGENT CARE | Age: 33
End: 2018-01-03

## 2018-01-04 NOTE — TELEPHONE ENCOUNTER
Patient notified of lab results. Pt had question about taking medication if culture was negative. After consulting with provider, Pt was told she did not need to take the medication. Pt has an appointment on 01/09/2018 with her provider and will follow up then as needed. Pt had no further questions.

## 2018-01-09 ENCOUNTER — PRENATAL OFFICE VISIT (OUTPATIENT)
Dept: OBGYN | Facility: CLINIC | Age: 33
End: 2018-01-09
Payer: COMMERCIAL

## 2018-01-09 VITALS
SYSTOLIC BLOOD PRESSURE: 113 MMHG | HEART RATE: 92 BPM | WEIGHT: 215 LBS | DIASTOLIC BLOOD PRESSURE: 68 MMHG | BODY MASS INDEX: 32.22 KG/M2

## 2018-01-09 DIAGNOSIS — Z34.81 ENCOUNTER FOR SUPERVISION OF OTHER NORMAL PREGNANCY IN FIRST TRIMESTER: ICD-10-CM

## 2018-01-09 PROCEDURE — 99207 ZZC PRENATAL VISIT: CPT | Performed by: OBSTETRICS & GYNECOLOGY

## 2018-01-09 NOTE — PROGRESS NOTES
Doing well, baby active.  Continues to be large for dates.  Has sonogram scheduled for 2 weeks, will do GBS and hgb then.  LT

## 2018-01-09 NOTE — MR AVS SNAPSHOT
"              After Visit Summary   1/9/2018    Marta Asencio    MRN: [id number]           Patient Information     Date Of Birth          1985        Visit Information        Provider Department      1/9/2018 5:30 PM Carmen Ingram MD [facility]        Today's Diagnoses     Encounter for supervision of other normal pregnancy in first trimester           Follow-ups after your visit        Follow-up notes from your care team     Return in about 2 weeks (around 1/23/2018).      Your next 10 appointments already scheduled     Jan 09, 2018  5:30 PM CST   ESTABLISHED PRENATAL with Carmen Ingram MD   [facility] ([facility])    [address] [place] [address] [phone]              Who to contact     If you have questions or need follow up information about today's clinic visit or your schedule please contact [facility] directly at [phone].  Normal or non-critical lab and imaging results will be communicated to you by MyChart, letter or phone within 4 business days after the clinic has received the results. If you do not hear from us within 7 days, please contact the clinic through MyChart or phone. If you have a critical or abnormal lab result, we will notify you by phone as soon as possible.  Submit refill requests through [employer] or call your pharmacy and they will forward the refill request to us. Please allow 3 business days for your refill to be completed.          Additional Information About Your Visit        Becovillagehart Information     [employer] lets you send messages to your doctor, view your test results, renew your prescriptions, schedule appointments and more. To sign up, go to [email or web address] . Click on \"Log in\" on the left side of the screen, which will take you to the Welcome page. Then click on \"Sign up Now\" on the right side of the page.     You will be asked to enter the access code " listed below, as well as some personal information. Please follow the directions to create your username and password.     Your access code is: [email or web address]  Expires: 2018  2:34 PM     Your access code will  in 90 days. If you need help or a new code, please call your [place] clinic or [phone].        Care EveryWhere ID     This is your Care EveryWhere ID. This could be used by other organizations to access your [place] medical records  [id number]        Your Vitals Were     Pulse Last Period BMI (Body Mass Index)             92 2017 32.22 kg/m2          Blood Pressure from Last 3 Encounters:   18 113/68   17 102/66   17 133/72    Weight from Last 3 Encounters:   18 215 lb (97.5 kg)   17 211 lb (95.7 kg)   17 208 lb (94.3 kg)              Today, you had the following     No orders found for display       Primary Care Provider Office Phone # Fax #    Carmen Ingram [phone]       [address]  [facility] [address]        Equal Access to Services     ROSALINDA SHEIKH AH: Hadii aad radha hadchazo Soyunali, waaxda luqadaha, qaybta kaalmada adeegyada, loc benson. So [facility] [phone].    ATENCIÓN: Si habla español, tiene a herman disposición servicios gratuitos de asistencia lingüística. Llame al [phone].    We comply with applicable federal civil rights laws and [place] laws. We do not discriminate on the basis of race, color, national origin, age, disability, sex, sexual orientation, or gender identity.            Thank you!     Thank you for choosing [facility]  for your care. Our goal is always to provide you with excellent care. Hearing back from our patients is one way we can continue to improve our services. Please take a few minutes to complete the written survey that you may receive in the mail after your visit with us. Thank you!             Your Updated Medication List - Protect  others around you: Learn how to safely use, store and throw away your medicines at www.disposemymeds.org.          This list is accurate as of: 1/9/18  5:04 PM.  Always use your most recent med list.                   Brand Name Dispense Instructions for use Diagnosis    acetaminophen 500 MG tablet    TYLENOL    50 tablet    Take 2 tablets (1,000 mg) by mouth every 6 hours as needed for mild pain    Upper back pain on left side, Back muscle spasm       cyclobenzaprine 5 MG tablet    FLEXERIL    21 tablet    Take 1 tablet (5 mg) by mouth 3 times daily as needed for muscle spasms    Back muscle spasm       doxylamine 25 MG Tabs tablet    UNISOM    28 each    Take 0.5 tablets (12.5 mg) by mouth 3 times daily Take with vitamin B6 for nausea/vomiting    Supervision of normal pregnancy, Nausea and vomiting during pregnancy       * nitroFURantoin (macrocrystal-monohydrate) 100 MG capsule    MACROBID    14 capsule    Take 1 capsule (100 mg) by mouth 2 times daily    Dysuria, Urinary frequency       * nitroFURantoin (macrocrystal-monohydrate) 100 MG capsule    MACROBID    14 capsule    Take 1 capsule (100 mg) by mouth 2 times daily    Dysuria, Urinary frequency       Prenatal Adult Gummy/DHA/FA 0.4-25 MG Chew     100 tablet    Take 1 chew tab by mouth daily    Supervision of normal pregnancy in second trimester       * prenatal multivitamin plus iron 27-0.8 MG Tabs per tablet      Take 1 tablet by mouth daily        * prenatal multivitamin plus iron 27-0.8 MG Tabs per tablet     100 tablet    Take 1 tablet by mouth daily    Supervision of normal pregnancy       * PRENATAL 19 Chew      CHEW AND SWALLOW 1 T PO D        pyridOXINE 25 MG tablet    vitamin B-6    100 tablet    Take 1 tablet (25 mg) by mouth 3 times daily Take with unisom for nausea/vomiting    Supervision of normal pregnancy, Nausea and vomiting during pregnancy       * Notice:  This list has 5 medication(s) that are the same as other medications prescribed for  you. Read the directions carefully, and ask your doctor or other care provider to review them with you.

## 2018-01-11 ENCOUNTER — TELEPHONE (OUTPATIENT)
Dept: OBGYN | Facility: CLINIC | Age: 33
End: 2018-01-11

## 2018-01-11 NOTE — TELEPHONE ENCOUNTER
Pt calling in stating that she would like to have a letter to state that she can reduce her hours at work. Pt normally works 8:00-4:30pm, 40 hours a week. Pt states she is required to work a minimum of 15 hours to maintain her position. Pt states that she feels like she can maintain 15-30 hours per week. Pt states that due to the complications in her pregnancy, she has not been sleeping well and between taking care of her family and her long (1 hour and 1/2) commute, she is exhausted and feels like a reduced work schedule would be more manageable at this time. Pt is asking for the reduced hours to begin this week as of 01/08/2017 (pt missed work Monday and Tuesday of this week) Pt states she thought about discussing this at her appointment with you on Tuesday, but just felt like it was going to pass and she would feel better. Please advise if letter is okay and what you would like for the letter to state. If approved the pt will provide a fax number of where to send the letter when we call her back. Shahida Brown RN

## 2018-01-12 NOTE — TELEPHONE ENCOUNTER
Patient is calling back again regarding message below. Please advise.   Maria Del Rosario Campbell RN-BSN

## 2018-01-12 NOTE — TELEPHONE ENCOUNTER
You will see my letter attached to her chart re: maximum of 20 hours per week for duration of her pregnancy.   Please print, co-sign letter and send to her HR.   Dr. Ingram

## 2018-01-12 NOTE — TELEPHONE ENCOUNTER
TC to the pt to let her know that the letter is ready. We need a fax number to send the letter to. If Pt calls back please get fax number and lync it to Triage. Shahida Brown RN

## 2018-01-23 ENCOUNTER — RADIANT APPOINTMENT (OUTPATIENT)
Dept: ULTRASOUND IMAGING | Facility: CLINIC | Age: 33
End: 2018-01-23
Attending: OBSTETRICS & GYNECOLOGY
Payer: COMMERCIAL

## 2018-01-23 ENCOUNTER — PRENATAL OFFICE VISIT (OUTPATIENT)
Dept: OBGYN | Facility: CLINIC | Age: 33
End: 2018-01-23
Attending: OBSTETRICS & GYNECOLOGY
Payer: COMMERCIAL

## 2018-01-23 VITALS — WEIGHT: 216 LBS | SYSTOLIC BLOOD PRESSURE: 114 MMHG | BODY MASS INDEX: 32.36 KG/M2 | DIASTOLIC BLOOD PRESSURE: 72 MMHG

## 2018-01-23 DIAGNOSIS — R30.0 DYSURIA: Primary | ICD-10-CM

## 2018-01-23 DIAGNOSIS — Z34.81 ENCOUNTER FOR SUPERVISION OF OTHER NORMAL PREGNANCY IN FIRST TRIMESTER: ICD-10-CM

## 2018-01-23 DIAGNOSIS — R82.90 NONSPECIFIC FINDING ON EXAMINATION OF URINE: ICD-10-CM

## 2018-01-23 LAB
ALBUMIN UR-MCNC: ABNORMAL MG/DL
APPEARANCE UR: CLEAR
BACTERIA #/AREA URNS HPF: ABNORMAL /HPF
BILIRUB UR QL STRIP: NEGATIVE
COLOR UR AUTO: YELLOW
GLUCOSE UR STRIP-MCNC: NEGATIVE MG/DL
HGB UR QL STRIP: ABNORMAL
KETONES UR STRIP-MCNC: NEGATIVE MG/DL
LEUKOCYTE ESTERASE UR QL STRIP: ABNORMAL
NITRATE UR QL: POSITIVE
NON-SQ EPI CELLS #/AREA URNS LPF: ABNORMAL /LPF
PH UR STRIP: 6 PH (ref 5–7)
RBC #/AREA URNS AUTO: ABNORMAL /HPF
SOURCE: ABNORMAL
SP GR UR STRIP: 1.02 (ref 1–1.03)
UROBILINOGEN UR STRIP-ACNC: 0.2 EU/DL (ref 0.2–1)
WBC #/AREA URNS AUTO: ABNORMAL /HPF

## 2018-01-23 PROCEDURE — 87086 URINE CULTURE/COLONY COUNT: CPT | Performed by: OBSTETRICS & GYNECOLOGY

## 2018-01-23 PROCEDURE — 87653 STREP B DNA AMP PROBE: CPT | Performed by: OBSTETRICS & GYNECOLOGY

## 2018-01-23 PROCEDURE — 99207 ZZC PRENATAL VISIT: CPT | Performed by: OBSTETRICS & GYNECOLOGY

## 2018-01-23 PROCEDURE — 76816 OB US FOLLOW-UP PER FETUS: CPT | Performed by: OBSTETRICS & GYNECOLOGY

## 2018-01-23 PROCEDURE — 81001 URINALYSIS AUTO W/SCOPE: CPT | Performed by: OBSTETRICS & GYNECOLOGY

## 2018-01-23 ASSESSMENT — PATIENT HEALTH QUESTIONNAIRE - PHQ9: SUM OF ALL RESPONSES TO PHQ QUESTIONS 1-9: 0

## 2018-01-23 NOTE — MR AVS SNAPSHOT
"              After Visit Summary   1/23/2018    Marta Asencio    MRN: [id number]           Patient Information     Date Of Birth          1985        Visit Information        Provider Department      1/23/2018 3:30 PM Carmen Ingram MD [facility]        Today's Diagnoses     Dysuria    -  1    Encounter for supervision of other normal pregnancy in 3rd  trimester        Nonspecific finding on examination of urine           Follow-ups after your visit        Follow-up notes from your care team     Return in about 1 week (around 1/30/2018).      Your next 10 appointments already scheduled     Jan 29, 2018  9:45 AM CST   ESTABLISHED PRENATAL with Carmen Ingram MD   [facility] ([facility])    [address] [place] [id number] [phone]              Who to contact     If you have questions or need follow up information about today's clinic visit or your schedule please contact [facility] directly at [phone].  Normal or non-critical lab and imaging results will be communicated to you by Rolithhart, letter or phone within 4 business days after the clinic has received the results. If you do not hear from us within 7 days, please contact the clinic through Rolithhart or phone. If you have a critical or abnormal lab result, we will notify you by phone as soon as possible.  Submit refill requests through [employer] or call your pharmacy and they will forward the refill request to us. Please allow 3 business days for your refill to be completed.          Additional Information About Your Visit        Rolithhart Information     [employer] lets you send messages to your doctor, view your test results, renew your prescriptions, schedule appointments and more. To sign up, go to [email or web address] . Click on \"Log in\" on the left side of the screen, which will take you to the Welcome page. Then click on \"Sign up Now\" on the right " side of the page.     You will be asked to enter the access code listed below, as well as some personal information. Please follow the directions to create your username and password.     Your access code is: [email or web address]  Expires: 2018  2:34 PM     Your access code will  in 90 days. If you need help or a new code, please call your [place] clinic or [phone].        Care EveryWhere ID     This is your Care EveryWhere ID. This could be used by other organizations to access your [place] medical records  [id number]        Your Vitals Were     Last Period BMI (Body Mass Index)                2017 32.36 kg/m2           Blood Pressure from Last 3 Encounters:   18 114/72   18 113/68   17 102/66    Weight from Last 3 Encounters:   18 98 kg (216 lb)   18 97.5 kg (215 lb)   17 95.7 kg (211 lb)              We Performed the Following     Strep, Group B by PCR     UA reflex to Microscopic and Culture     Urine Culture Aerobic Bacterial     Urine Microscopic        Primary Care Provider Office Phone # Fax #    Carmen Ingram [phone]       [address] [place] [address]        Equal Access to Services     BECCA SHEIKH : Hadii aad ku hadasho Soomaali, waaxda luqadaha, qaybta kaalmada adeegyada, waxay idiin hayemren shalini orona . So [facility] [phone].    ATENCIÓN: Si habla español, tiene a herman disposición servicios gratuitos de asistencia lingüística. Llame al [phone].    We comply with applicable federal civil rights laws and [place] laws. We do not discriminate on the basis of race, color, national origin, age, disability, sex, sexual orientation, or gender identity.            Thank you!     Thank you for choosing [facility]  for your care. Our goal is always to provide you with excellent care. Hearing back from our patients is one way we can continue to improve our services. Please take a few minutes to  complete the written survey that you may receive in the mail after your visit with us. Thank you!             Your Updated Medication List - Protect others around you: Learn how to safely use, store and throw away your medicines at www.disposemymeds.org.          This list is accurate as of: 1/23/18  4:02 PM.  Always use your most recent med list.                   Brand Name Dispense Instructions for use Diagnosis    acetaminophen 500 MG tablet    TYLENOL    50 tablet    Take 2 tablets (1,000 mg) by mouth every 6 hours as needed for mild pain    Upper back pain on left side, Back muscle spasm       cyclobenzaprine 5 MG tablet    FLEXERIL    21 tablet    Take 1 tablet (5 mg) by mouth 3 times daily as needed for muscle spasms    Back muscle spasm       doxylamine 25 MG Tabs tablet    UNISOM    28 each    Take 0.5 tablets (12.5 mg) by mouth 3 times daily Take with vitamin B6 for nausea/vomiting    Supervision of normal pregnancy, Nausea and vomiting during pregnancy       * nitroFURantoin (macrocrystal-monohydrate) 100 MG capsule    MACROBID    14 capsule    Take 1 capsule (100 mg) by mouth 2 times daily    Dysuria, Urinary frequency       * nitroFURantoin (macrocrystal-monohydrate) 100 MG capsule    MACROBID    14 capsule    Take 1 capsule (100 mg) by mouth 2 times daily    Dysuria, Urinary frequency       Prenatal Adult Gummy/DHA/FA 0.4-25 MG Chew     100 tablet    Take 1 chew tab by mouth daily    Supervision of normal pregnancy in second trimester       * prenatal multivitamin plus iron 27-0.8 MG Tabs per tablet      Take 1 tablet by mouth daily        * prenatal multivitamin plus iron 27-0.8 MG Tabs per tablet     100 tablet    Take 1 tablet by mouth daily    Supervision of normal pregnancy       * PRENATAL 19 Chew      CHEW AND SWALLOW 1 T PO D        pyridOXINE 25 MG tablet    vitamin B-6    100 tablet    Take 1 tablet (25 mg) by mouth 3 times daily Take with unisom for nausea/vomiting    Supervision of normal  pregnancy, Nausea and vomiting during pregnancy       * Notice:  This list has 5 medication(s) that are the same as other medications prescribed for you. Read the directions carefully, and ask your doctor or other care provider to review them with you.

## 2018-01-23 NOTE — PROGRESS NOTES
Has heartburn.  Tired, doing better with shorter work day.  US today shows EFW at 72 %ile, BPD is 88%ile.  Likely 8- 8.5 at due date. Has a UTI, UA today shows nitrates.  Has a Rx for Macrobid, will start tonight. 7 day course.   RTC weekly, take mylanta, if needed prilosec for GERD.  LT

## 2018-01-24 LAB
BACTERIA SPEC CULT: NORMAL
BACTERIA SPEC CULT: NORMAL
GP B STREP DNA SPEC QL NAA+PROBE: NEGATIVE
SPECIMEN SOURCE: NORMAL
SPECIMEN SOURCE: NORMAL

## 2018-01-29 ENCOUNTER — PRENATAL OFFICE VISIT (OUTPATIENT)
Dept: OBGYN | Facility: CLINIC | Age: 33
End: 2018-01-29
Payer: COMMERCIAL

## 2018-01-29 VITALS
WEIGHT: 216 LBS | DIASTOLIC BLOOD PRESSURE: 75 MMHG | HEART RATE: 97 BPM | SYSTOLIC BLOOD PRESSURE: 118 MMHG | BODY MASS INDEX: 32.36 KG/M2

## 2018-01-29 DIAGNOSIS — Z34.81 ENCOUNTER FOR SUPERVISION OF OTHER NORMAL PREGNANCY IN FIRST TRIMESTER: Primary | ICD-10-CM

## 2018-01-29 PROCEDURE — 99207 ZZC PRENATAL VISIT: CPT | Performed by: OBSTETRICS & GYNECOLOGY

## 2018-01-29 NOTE — MR AVS SNAPSHOT
"              After Visit Summary   1/29/2018    Marta Asencio    MRN: [id number]           Patient Information     Date Of Birth          1985        Visit Information        Provider Department      1/29/2018 9:45 AM Carmen Ingram MD [facility]        Today's Diagnoses     Encounter for supervision of other normal pregnancy in 3rd  trimester    -  1       Follow-ups after your visit        Follow-up notes from your care team     Return in about 1 week (around 2/5/2018).      Your next 10 appointments already scheduled     Feb 08, 2018  9:00 AM CST   ESTABLISHED PRENATAL with Carmen Ingram MD   [facility] ([facility])    [address] [place] [address] [phone]              Who to contact     If you have questions or need follow up information about today's clinic visit or your schedule please contact [facility] directly at [phone].  Normal or non-critical lab and imaging results will be communicated to you by MyChart, letter or phone within 4 business days after the clinic has received the results. If you do not hear from us within 7 days, please contact the clinic through [employer]hart or phone. If you have a critical or abnormal lab result, we will notify you by phone as soon as possible.  Submit refill requests through [employer] or call your pharmacy and they will forward the refill request to us. Please allow 3 business days for your refill to be completed.          Additional Information About Your Visit        [employer]hart Information     [employer] lets you send messages to your doctor, view your test results, renew your prescriptions, schedule appointments and more. To sign up, go to [email or web address] . Click on \"Log in\" on the left side of the screen, which will take you to the Welcome page. Then click on \"Sign up Now\" on the right side of the page.     You will be asked to enter the access code " listed below, as well as some personal information. Please follow the directions to create your username and password.     Your access code is: [email or web address]  Expires: 2018  2:34 PM     Your access code will  in 90 days. If you need help or a new code, please call your [place] clinic or [phone].        Care EveryWhere ID     This is your Care EveryWhere ID. This could be used by other organizations to access your [place] medical records  [id number]        Your Vitals Were     Pulse Last Period BMI (Body Mass Index)             97 2017 32.36 kg/m2          Blood Pressure from Last 3 Encounters:   18 118/75   18 114/72   18 113/68    Weight from Last 3 Encounters:   18 216 lb (98 kg)   18 216 lb (98 kg)   18 215 lb (97.5 kg)              Today, you had the following     No orders found for display       Primary Care Provider Office Phone # Fax #    Carmen Ingram [phone]       [address]  [facility] [address]        Equal Access to Services     [facility] : Hadii aad ku hadasho Soyunali, waaxda luqadaha, qaybta kaalmada adeegyada, loc orona . So [facility] [phone].    ATENCIÓN: Si habla español, tiene a herman disposición servicios gratuitos de asistencia lingüística. Salenaame al [phone].    We comply with applicable federal civil rights laws and [place] laws. We do not discriminate on the basis of race, color, national origin, age, disability, sex, sexual orientation, or gender identity.            Thank you!     Thank you for choosing [facility]  for your care. Our goal is always to provide you with excellent care. Hearing back from our patients is one way we can continue to improve our services. Please take a few minutes to complete the written survey that you may receive in the mail after your visit with us. Thank you!             Your Updated Medication List - Protect others  around you: Learn how to safely use, store and throw away your medicines at www.disposemymeds.org.          This list is accurate as of 1/29/18 10:30 AM.  Always use your most recent med list.                   Brand Name Dispense Instructions for use Diagnosis    acetaminophen 500 MG tablet    TYLENOL    50 tablet    Take 2 tablets (1,000 mg) by mouth every 6 hours as needed for mild pain    Upper back pain on left side, Back muscle spasm       cyclobenzaprine 5 MG tablet    FLEXERIL    21 tablet    Take 1 tablet (5 mg) by mouth 3 times daily as needed for muscle spasms    Back muscle spasm       doxylamine 25 MG Tabs tablet    UNISOM    28 each    Take 0.5 tablets (12.5 mg) by mouth 3 times daily Take with vitamin B6 for nausea/vomiting    Supervision of normal pregnancy, Nausea and vomiting during pregnancy       * nitroFURantoin (macrocrystal-monohydrate) 100 MG capsule    MACROBID    14 capsule    Take 1 capsule (100 mg) by mouth 2 times daily    Dysuria, Urinary frequency       * nitroFURantoin (macrocrystal-monohydrate) 100 MG capsule    MACROBID    14 capsule    Take 1 capsule (100 mg) by mouth 2 times daily    Dysuria, Urinary frequency       Prenatal Adult Gummy/DHA/FA 0.4-25 MG Chew     100 tablet    Take 1 chew tab by mouth daily    Supervision of normal pregnancy in second trimester       * prenatal multivitamin plus iron 27-0.8 MG Tabs per tablet      Take 1 tablet by mouth daily        * prenatal multivitamin plus iron 27-0.8 MG Tabs per tablet     100 tablet    Take 1 tablet by mouth daily    Supervision of normal pregnancy       * PRENATAL 19 Chew      CHEW AND SWALLOW 1 T PO D        pyridOXINE 25 MG tablet    vitamin B-6    100 tablet    Take 1 tablet (25 mg) by mouth 3 times daily Take with unisom for nausea/vomiting    Supervision of normal pregnancy, Nausea and vomiting during pregnancy       * Notice:  This list has 5 medication(s) that are the same as other medications prescribed for you.  Read the directions carefully, and ask your doctor or other care provider to review them with you.

## 2018-01-29 NOTE — PROGRESS NOTES
"Doing well, \"ready\" mentally.  Discussed birth control, this wasn't a planned pregnancy and she doesn't want to risk another unplanned one.  Asking about tubal ligation.    Discussed TL risks/benefits, also LARC.  Suggest she might consider Mirena IUD due to equivalence with TL in efficacy, and non-contraceptive benefit.  Sending information via email.  RTC weekly, discussed checking cervic at 39 weeks and if favorable may offer elective IOL.    "

## 2018-02-08 ENCOUNTER — PRENATAL OFFICE VISIT (OUTPATIENT)
Dept: OBGYN | Facility: CLINIC | Age: 33
End: 2018-02-08
Payer: COMMERCIAL

## 2018-02-08 VITALS
BODY MASS INDEX: 32.51 KG/M2 | WEIGHT: 217 LBS | HEART RATE: 62 BPM | DIASTOLIC BLOOD PRESSURE: 69 MMHG | SYSTOLIC BLOOD PRESSURE: 109 MMHG

## 2018-02-08 DIAGNOSIS — Z34.81 ENCOUNTER FOR SUPERVISION OF OTHER NORMAL PREGNANCY IN FIRST TRIMESTER: Primary | ICD-10-CM

## 2018-02-08 PROCEDURE — 99207 ZZC PRENATAL VISIT: CPT | Performed by: OBSTETRICS & GYNECOLOGY

## 2018-02-08 RX ORDER — ONDANSETRON 2 MG/ML
4 INJECTION INTRAMUSCULAR; INTRAVENOUS EVERY 6 HOURS PRN
Status: CANCELLED | OUTPATIENT
Start: 2018-02-08

## 2018-02-08 NOTE — MR AVS SNAPSHOT
"              After Visit Summary   2018    Marta Asencio    MRN: [id number]           Patient Information     Date Of Birth          1985        Visit Information        Provider Department      2018 9:00 AM Carmen Ingram MD [facility]        Today's Diagnoses     Encounter for supervision of other normal pregnancy in 3rd  trimester    -  1       Follow-ups after your visit        Who to contact     If you have questions or need follow up information about today's clinic visit or your schedule please contact [facility] directly at [phone].  Normal or non-critical lab and imaging results will be communicated to you by YellowKornerhart, letter or phone within 4 business days after the clinic has received the results. If you do not hear from us within 7 days, please contact the clinic through [employer] or phone. If you have a critical or abnormal lab result, we will notify you by phone as soon as possible.  Submit refill requests through [employer] or call your pharmacy and they will forward the refill request to us. Please allow 3 business days for your refill to be completed.          Additional Information About Your Visit        MyChart Information     [employer] lets you send messages to your doctor, view your test results, renew your prescriptions, schedule appointments and more. To sign up, go to [email or web address] . Click on \"Log in\" on the left side of the screen, which will take you to the Welcome page. Then click on \"Sign up Now\" on the right side of the page.     You will be asked to enter the access code listed below, as well as some personal information. Please follow the directions to create your username and password.     Your access code is: [email or web address]  Expires: 2018  9:30 AM     Your access code will  in 90 days. If you need help or a new code, please call your [facility] or [phone].        Care EveryWhere ID     This is your Care " EveryWhere ID. This could be used by other organizations to access your [place] medical records  [id number]        Your Vitals Were     Pulse Last Period BMI (Body Mass Index)             62 05/20/2017 32.51 kg/m2          Blood Pressure from Last 3 Encounters:   02/08/18 109/69   01/29/18 118/75   01/23/18 114/72    Weight from Last 3 Encounters:   02/08/18 217 lb (98.4 kg)   01/29/18 216 lb (98 kg)   01/23/18 216 lb (98 kg)              Today, you had the following     No orders found for display       Primary Care Provider Office Phone # Fax #    Carmen Ingram [phone]       [address] [address]        Equal Access to Services     BECCA SHEIKH : Hadii naima zhaoo Sode, waaxda luqadaha, qaybta kaalmada adeegyada, loc orona . So [facility] [phone].    ATENCIÓN: Si habla español, tiene a herman disposición servicios gratuitos de asistencia lingüística. Llame al [phone].    We comply with applicable federal civil rights laws and [place] laws. We do not discriminate on the basis of race, color, national origin, age, disability, sex, sexual orientation, or gender identity.            Thank you!     Thank you for choosing [facility]  for your care. Our goal is always to provide you with excellent care. Hearing back from our patients is one way we can continue to improve our services. Please take a few minutes to complete the written survey that you may receive in the mail after your visit with us. Thank you!             Your Updated Medication List - Protect others around you: Learn how to safely use, store and throw away your medicines at www.disposemymeds.org.          This list is accurate as of 2/8/18  9:30 AM.  Always use your most recent med list.                   Brand Name Dispense Instructions for use Diagnosis    acetaminophen 500 MG tablet    TYLENOL    50 tablet    Take 2 tablets (1,000 mg) by mouth  every 6 hours as needed for mild pain    Upper back pain on left side, Back muscle spasm       cyclobenzaprine 5 MG tablet    FLEXERIL    21 tablet    Take 1 tablet (5 mg) by mouth 3 times daily as needed for muscle spasms    Back muscle spasm       doxylamine 25 MG Tabs tablet    UNISOM    28 each    Take 0.5 tablets (12.5 mg) by mouth 3 times daily Take with vitamin B6 for nausea/vomiting    Supervision of normal pregnancy, Nausea and vomiting during pregnancy       * nitroFURantoin (macrocrystal-monohydrate) 100 MG capsule    MACROBID    14 capsule    Take 1 capsule (100 mg) by mouth 2 times daily    Dysuria, Urinary frequency       * nitroFURantoin (macrocrystal-monohydrate) 100 MG capsule    MACROBID    14 capsule    Take 1 capsule (100 mg) by mouth 2 times daily    Dysuria, Urinary frequency       Prenatal Adult Gummy/DHA/FA 0.4-25 MG Chew     100 tablet    Take 1 chew tab by mouth daily    Supervision of normal pregnancy in second trimester       * prenatal multivitamin plus iron 27-0.8 MG Tabs per tablet      Take 1 tablet by mouth daily        * prenatal multivitamin plus iron 27-0.8 MG Tabs per tablet     100 tablet    Take 1 tablet by mouth daily    Supervision of normal pregnancy       * PRENATAL 19 Chew      CHEW AND SWALLOW 1 T PO D        pyridOXINE 25 MG tablet    vitamin B-6    100 tablet    Take 1 tablet (25 mg) by mouth 3 times daily Take with unisom for nausea/vomiting    Supervision of normal pregnancy, Nausea and vomiting during pregnancy       * Notice:  This list has 5 medication(s) that are the same as other medications prescribed for you. Read the directions carefully, and ask your doctor or other care provider to review them with you.

## 2018-02-08 NOTE — PROGRESS NOTES
GBS: Negative  Hemoglobin   Date Value Ref Range Status   11/03/2017 11.6 (L) 11.7 - 15.7 g/dL Final       Breast pump rx: fill inpatient  Labor orders: signed and held  Birth plan: likely will ask for an epidural   Length of stay: discussed  Disability paperwork: NA  Resident involvement: discussed and agrees.    No concerns.  RTC weekly.  LT

## 2018-02-13 ENCOUNTER — PRENATAL OFFICE VISIT (OUTPATIENT)
Dept: OBGYN | Facility: CLINIC | Age: 33
End: 2018-02-13
Payer: COMMERCIAL

## 2018-02-13 VITALS
SYSTOLIC BLOOD PRESSURE: 113 MMHG | BODY MASS INDEX: 32.66 KG/M2 | TEMPERATURE: 98.1 F | HEART RATE: 89 BPM | DIASTOLIC BLOOD PRESSURE: 74 MMHG | WEIGHT: 218 LBS

## 2018-02-13 DIAGNOSIS — Z34.81 ENCOUNTER FOR SUPERVISION OF OTHER NORMAL PREGNANCY IN FIRST TRIMESTER: Primary | ICD-10-CM

## 2018-02-13 PROCEDURE — 99207 ZZC PRENATAL VISIT: CPT | Performed by: OBSTETRICS & GYNECOLOGY

## 2018-02-13 NOTE — PROGRESS NOTES
39w2d  No questions or concerns since last visit.  Getting more fatigued with pregnancy, but no contractions, vaginal bleeding or leakage of fluid.  ++ fetal movement.  Benz score 2 today, so not candidate for elective induction at this point.  Offered membrane stripping, but patient declined  Discussed labor precautions and RTC 1w for SVE.  Could consider induction if Benz at least 5.  Lilia Madsen MD

## 2018-02-19 ENCOUNTER — PRENATAL OFFICE VISIT (OUTPATIENT)
Dept: OBGYN | Facility: CLINIC | Age: 33
End: 2018-02-19
Payer: COMMERCIAL

## 2018-02-19 VITALS
SYSTOLIC BLOOD PRESSURE: 98 MMHG | OXYGEN SATURATION: 97 % | BODY MASS INDEX: 32.76 KG/M2 | WEIGHT: 221.2 LBS | TEMPERATURE: 98.2 F | DIASTOLIC BLOOD PRESSURE: 58 MMHG | HEIGHT: 69 IN | HEART RATE: 90 BPM

## 2018-02-19 DIAGNOSIS — Z34.81 ENCOUNTER FOR SUPERVISION OF OTHER NORMAL PREGNANCY IN FIRST TRIMESTER: Primary | ICD-10-CM

## 2018-02-19 PROCEDURE — 99207 ZZC PRENATAL VISIT: CPT | Performed by: OBSTETRICS & GYNECOLOGY

## 2018-02-19 NOTE — PROGRESS NOTES
40w1d   Very uncomfortable.  Ready to be done. Wants her membranes stripped today.    Baby is moving ok.  occ mild ctx's.  Cervix with garcía's of 5.  Would like to come in for IOL.    Plan tomorrow evening come in ~ 8 PM to begin.  discussed IOL and patient hoping to have an epidural with labor.  COLETTE

## 2018-02-19 NOTE — MR AVS SNAPSHOT
"              After Visit Summary   2018    Marta Asencio    MRN: [id number]           Patient Information     Date Of Birth          1985        Visit Information        Provider Department      2018 2:45 PM Ivone Villa [place] [facility] [place]'s Diagnoses     Encounter for supervision of other normal pregnancy in 3rd  trimester    -  1       Follow-ups after your visit        Who to contact     If you have questions or need follow up information about today's clinic visit or your schedule please contact [facility] at [phone].  Normal or non-critical lab and imaging results will be communicated to you by [employer], letter or phone within 4 business days after the clinic has received the results. If you do not hear from us within 7 days, please contact the clinic through [employer] or phone. If you have a critical or abnormal lab result, we will notify you by phone as soon as possible.  Submit refill requests through [employer] or call your pharmacy and they will forward the refill request to us. Please allow 3 business days for your refill to be completed.          Additional Information About Your Visit        MyChart Information     [employer] lets you send messages to your doctor, view your test results, renew your prescriptions, schedule appointments and more. To sign up, go to [email or web address] . Click on \"Log in\" on the left side of the screen, which will take you to the Welcome page. Then click on \"Sign up Now\" on the right side of the page.     You will be asked to enter the access code listed below, as well as some personal information. Please follow the directions to create your username and password.     Your access code is: [email or web address]  Expires: 2018  9:30 AM     Your access code will  in 90 days. If you need help or a new code, please call your [facility] or [phone].        Care EveryWhere ID     This is " "your Care EveryWhere ID. This could be used by other organizations to access your [place] medical records  [id number]        Your Vitals Were     Pulse Temperature Height Last Period Pulse Oximetry BMI (Body Mass Index)    90 98.2  F (36.8  C) (Oral) 5' 8.5\" (1.74 m) 05/20/2017 97% 33.14 kg/m2       Blood Pressure from Last 3 Encounters:   02/19/18 98/58   02/13/18 113/74   02/08/18 109/69    Weight from Last 3 Encounters:   02/19/18 221 lb 3.2 oz (100.3 kg)   02/13/18 218 lb (98.9 kg)   02/08/18 217 lb (98.4 kg)              Today, you had the following     No orders found for display         Today's Medication Changes          These changes are accurate as of 2/19/18  3:27 PM.  If you have any questions, ask your nurse or doctor.               These medicines have changed or have updated prescriptions.        Dose/Directions    PRENATAL 19 Chew   This may have changed:  Another medication with the same name was removed. Continue taking this medication, and follow the directions you see here.   Changed by:  Ivone Villa MD        CHEW AND SWALLOW 1 T PO D   Refills:  3         Stop taking these medicines if you haven't already. Please contact your care team if you have questions.     nitroFURantoin (macrocrystal-monohydrate) 100 MG capsule   Commonly known as:  MACROBID   Stopped by:  Ivone Villa MD           Prenatal Adult Gummy/DHA/FA 0.4-25 MG Chew   Stopped by:  Ivone Villa MD                    Primary Care Provider Office Phone # Fax #    Carmen Ingram [phone]       [address] [facility] [address]  [facility] [address]        Equal Access to Services     [facility] AGUILAR AH: Claudia Kimball, waaidanda luko, qacarlota kaalmada danna, loc benson. Makayla [facility] [place] [phone].    ATENCIÓN: Si habla español, tiene a herman disposición servicios gratuitos de asistencia lingüística. [place]nery al [phone].    We comply with " applicable federal civil rights laws and [place] laws. We do not discriminate on the basis of race, color, national origin, age, disability, sex, sexual orientation, or gender identity.            Thank you!     Thank you for choosing [facility]  for your care. Our goal is always to provide you with excellent care. Hearing back from our patients is one way we can continue to improve our services. Please take a few minutes to complete the written survey that you may receive in the mail after your visit with us. Thank you!             Your Updated Medication List - Protect others around you: Learn how to safely use, store and throw away your medicines at www.disposemymeds.org.          This list is accurate as of 2/19/18  3:27 PM.  Always use your most recent med list.                   Brand Name Dispense Instructions for use Diagnosis    acetaminophen 500 MG tablet    TYLENOL    50 tablet    Take 2 tablets (1,000 mg) by mouth every 6 hours as needed for mild pain    Upper back pain on left side, Back muscle spasm       cyclobenzaprine 5 MG tablet    FLEXERIL    21 tablet    Take 1 tablet (5 mg) by mouth 3 times daily as needed for muscle spasms    Back muscle spasm       doxylamine 25 MG Tabs tablet    UNISOM    28 each    Take 0.5 tablets (12.5 mg) by mouth 3 times daily Take with vitamin B6 for nausea/vomiting    Supervision of normal pregnancy, Nausea and vomiting during pregnancy       PRENATAL 19 Chew      CHEW AND SWALLOW 1 T PO D        pyridOXINE 25 MG tablet    vitamin B-6    100 tablet    Take 1 tablet (25 mg) by mouth 3 times daily Take with unisom for nausea/vomiting    Supervision of normal pregnancy, Nausea and vomiting during pregnancy

## 2018-02-19 NOTE — Clinical Note
at 40w1d coming in for IOL Tuesday  evening at 8 PM.   3rd baby and patient is  Uncomfortable.  Seems bigger -- like ~ 8+ maybe 9 lbs.   Bishops of 5 today.

## 2018-02-20 ENCOUNTER — ANESTHESIA EVENT (OUTPATIENT)
Dept: OBGYN | Facility: CLINIC | Age: 33
End: 2018-02-20
Payer: COMMERCIAL

## 2018-02-20 ENCOUNTER — ANESTHESIA (OUTPATIENT)
Dept: OBGYN | Facility: CLINIC | Age: 33
End: 2018-02-20
Payer: COMMERCIAL

## 2018-02-20 ENCOUNTER — HOSPITAL ENCOUNTER (INPATIENT)
Facility: CLINIC | Age: 33
LOS: 3 days | Discharge: HOME | End: 2018-02-23
Attending: OBSTETRICS & GYNECOLOGY | Admitting: OBSTETRICS & GYNECOLOGY
Payer: COMMERCIAL

## 2018-02-20 LAB
ABO + RH BLD: NORMAL
ABO + RH BLD: NORMAL
ERYTHROCYTE [DISTWIDTH] IN BLOOD BY AUTOMATED COUNT: 13.5 % (ref 10–15)
HCT VFR BLD AUTO: 33.9 % (ref 35–47)
HGB BLD-MCNC: 11.1 G/DL (ref 11.7–15.7)
MCH RBC QN AUTO: 32 PG (ref 26.5–33)
MCHC RBC AUTO-ENTMCNC: 32.7 G/DL (ref 31.5–36.5)
MCV RBC AUTO: 98 FL (ref 78–100)
PLATELET # BLD AUTO: 206 10E9/L (ref 150–450)
RBC # BLD AUTO: 3.47 10E12/L (ref 3.8–5.2)
SPECIMEN EXP DATE BLD: NORMAL
WBC # BLD AUTO: 7.4 10E9/L (ref 4–11)

## 2018-02-20 PROCEDURE — 3E0R3BZ INTRODUCTION OF ANESTHETIC AGENT INTO SPINAL CANAL, PERCUTANEOUS APPROACH: ICD-10-PCS | Performed by: ANESTHESIOLOGY

## 2018-02-20 PROCEDURE — 86900 BLOOD TYPING SEROLOGIC ABO: CPT | Performed by: STUDENT IN AN ORGANIZED HEALTH CARE EDUCATION/TRAINING PROGRAM

## 2018-02-20 PROCEDURE — 00HU33Z INSERTION OF INFUSION DEVICE INTO SPINAL CANAL, PERCUTANEOUS APPROACH: ICD-10-PCS | Performed by: ANESTHESIOLOGY

## 2018-02-20 PROCEDURE — 12000032 ZZH R&B OB CRITICAL UMMC

## 2018-02-20 PROCEDURE — 85027 COMPLETE CBC AUTOMATED: CPT | Performed by: STUDENT IN AN ORGANIZED HEALTH CARE EDUCATION/TRAINING PROGRAM

## 2018-02-20 PROCEDURE — 36415 COLL VENOUS BLD VENIPUNCTURE: CPT | Performed by: STUDENT IN AN ORGANIZED HEALTH CARE EDUCATION/TRAINING PROGRAM

## 2018-02-20 PROCEDURE — 86780 TREPONEMA PALLIDUM: CPT | Performed by: STUDENT IN AN ORGANIZED HEALTH CARE EDUCATION/TRAINING PROGRAM

## 2018-02-20 PROCEDURE — 3E0P7VZ INTRODUCTION OF HORMONE INTO FEMALE REPRODUCTIVE, VIA NATURAL OR ARTIFICIAL OPENING: ICD-10-PCS | Performed by: OBSTETRICS & GYNECOLOGY

## 2018-02-20 PROCEDURE — 25000132 ZZH RX MED GY IP 250 OP 250 PS 637: Performed by: STUDENT IN AN ORGANIZED HEALTH CARE EDUCATION/TRAINING PROGRAM

## 2018-02-20 PROCEDURE — 86901 BLOOD TYPING SEROLOGIC RH(D): CPT | Performed by: STUDENT IN AN ORGANIZED HEALTH CARE EDUCATION/TRAINING PROGRAM

## 2018-02-20 RX ORDER — LIDOCAINE 40 MG/G
CREAM TOPICAL
Status: DISCONTINUED | OUTPATIENT
Start: 2018-02-20 | End: 2018-02-22

## 2018-02-20 RX ORDER — MISOPROSTOL 100 UG/1
25 TABLET ORAL
Status: DISCONTINUED | OUTPATIENT
Start: 2018-02-20 | End: 2018-02-22

## 2018-02-20 RX ORDER — OXYTOCIN/0.9 % SODIUM CHLORIDE 30/500 ML
100-340 PLASTIC BAG, INJECTION (ML) INTRAVENOUS CONTINUOUS PRN
Status: COMPLETED | OUTPATIENT
Start: 2018-02-20 | End: 2018-02-22

## 2018-02-20 RX ORDER — IBUPROFEN 800 MG/1
800 TABLET, FILM COATED ORAL
Status: DISCONTINUED | OUTPATIENT
Start: 2018-02-20 | End: 2018-02-22

## 2018-02-20 RX ORDER — OXYCODONE AND ACETAMINOPHEN 5; 325 MG/1; MG/1
1 TABLET ORAL
Status: DISCONTINUED | OUTPATIENT
Start: 2018-02-20 | End: 2018-02-22

## 2018-02-20 RX ORDER — PROCHLORPERAZINE 25 MG
25 SUPPOSITORY, RECTAL RECTAL EVERY 12 HOURS PRN
Status: DISCONTINUED | OUTPATIENT
Start: 2018-02-20 | End: 2018-02-22

## 2018-02-20 RX ORDER — FENTANYL CITRATE 50 UG/ML
50-100 INJECTION, SOLUTION INTRAMUSCULAR; INTRAVENOUS
Status: DISCONTINUED | OUTPATIENT
Start: 2018-02-20 | End: 2018-02-22

## 2018-02-20 RX ORDER — METHYLERGONOVINE MALEATE 0.2 MG/ML
200 INJECTION INTRAVENOUS
Status: DISCONTINUED | OUTPATIENT
Start: 2018-02-20 | End: 2018-02-22

## 2018-02-20 RX ORDER — CALCIUM CARBONATE 500 MG/1
1000 TABLET, CHEWABLE ORAL 3 TIMES DAILY PRN
Status: DISCONTINUED | OUTPATIENT
Start: 2018-02-20 | End: 2018-02-22

## 2018-02-20 RX ORDER — NALOXONE HYDROCHLORIDE 0.4 MG/ML
.1-.4 INJECTION, SOLUTION INTRAMUSCULAR; INTRAVENOUS; SUBCUTANEOUS
Status: DISCONTINUED | OUTPATIENT
Start: 2018-02-20 | End: 2018-02-22

## 2018-02-20 RX ORDER — CARBOPROST TROMETHAMINE 250 UG/ML
250 INJECTION, SOLUTION INTRAMUSCULAR
Status: DISCONTINUED | OUTPATIENT
Start: 2018-02-20 | End: 2018-02-22

## 2018-02-20 RX ORDER — METOCLOPRAMIDE HYDROCHLORIDE 5 MG/ML
10 INJECTION INTRAMUSCULAR; INTRAVENOUS EVERY 6 HOURS PRN
Status: DISCONTINUED | OUTPATIENT
Start: 2018-02-20 | End: 2018-02-22

## 2018-02-20 RX ORDER — ACETAMINOPHEN 325 MG/1
650 TABLET ORAL EVERY 4 HOURS PRN
Status: DISCONTINUED | OUTPATIENT
Start: 2018-02-20 | End: 2018-02-22

## 2018-02-20 RX ORDER — HYDROXYZINE HYDROCHLORIDE 50 MG/1
100 TABLET, FILM COATED ORAL
Status: DISCONTINUED | OUTPATIENT
Start: 2018-02-20 | End: 2018-02-22

## 2018-02-20 RX ORDER — ONDANSETRON 2 MG/ML
4 INJECTION INTRAMUSCULAR; INTRAVENOUS EVERY 6 HOURS PRN
Status: DISCONTINUED | OUTPATIENT
Start: 2018-02-20 | End: 2018-02-22

## 2018-02-20 RX ORDER — OXYTOCIN 10 [USP'U]/ML
10 INJECTION, SOLUTION INTRAMUSCULAR; INTRAVENOUS
Status: DISCONTINUED | OUTPATIENT
Start: 2018-02-20 | End: 2018-02-22

## 2018-02-20 RX ORDER — SODIUM CHLORIDE, SODIUM LACTATE, POTASSIUM CHLORIDE, CALCIUM CHLORIDE 600; 310; 30; 20 MG/100ML; MG/100ML; MG/100ML; MG/100ML
INJECTION, SOLUTION INTRAVENOUS CONTINUOUS
Status: DISCONTINUED | OUTPATIENT
Start: 2018-02-20 | End: 2018-02-22

## 2018-02-20 RX ORDER — MORPHINE SULFATE 10 MG/ML
10 INJECTION, SOLUTION INTRAMUSCULAR; INTRAVENOUS
Status: DISCONTINUED | OUTPATIENT
Start: 2018-02-20 | End: 2018-02-22

## 2018-02-20 RX ADMIN — Medication 25 MCG: at 22:28

## 2018-02-20 ASSESSMENT — LIFESTYLE VARIABLES: TOBACCO_USE: 1

## 2018-02-20 NOTE — IP AVS SNAPSHOT
MRN:[id number]                      After Visit Summary   2/20/2018    Marta Asencio    MRN: [id number]           Thank you!     Thank you for choosing [facility] for your care. Our goal is always to provide you with excellent care. Hearing back from our patients is one way we can continue to improve our services. Please take a few minutes to complete the written survey that you may receive in the mail after you visit with us. Thank you!        Patient Information     Date Of Birth          1985        Designated Caregiver       Most Recent Value    Caregiver    Will someone help with your care after discharge? no      About your hospital stay     You were admitted on:  February 20, 2018 You last received care in the:  [facility]    You were discharged on:  February 23, 2018        Reason for your hospital stay       Maternity care                  Who to Call     For medical emergencies, please call 911.  For non-urgent questions about your medical care, please call your primary care provider or clinic, [phone]          Attending Provider     Provider Specialty    Lilia Hall MD OB/Gyn    Juan Jose, Carmen GARCIA MD OB/Gyn    Jere, Kitty DIXON MD OB/Gyn       Primary Care Provider Office Phone # Fax #    Sonal Hamzah Esqueda [phone]      After Care Instructions     Activity       Review discharge instructions            Diet       Resume previous diet            Discharge Instructions - Postpartum visit       Schedule postpartum visit with your provider and return to clinic in 6 weeks.                  Further instructions from your care team       Postpartum Vaginal Delivery Instructions    Activity       Ask family and friends for help when you need it.    Do not place anything in your vagina for 6 weeks.    You are not restricted on other activities, but take it easy for a few weeks to allow your body to recover from delivery.  You are able to do  any activities you feel up to that point.    No driving until you have stopped taking your pain medications (usually two weeks after delivery).     Call your health care provider if you have any of these symptoms:       Increased pain, swelling, redness, or fluid around your stiches from an episiotomy or perineal tear.    A fever above 100.4 F (38 C) with or without chills when placing a thermometer under your tongue.    You soak a sanitary pad with blood within 1 hour, or you see blood clots larger than a golf ball.    Bleeding that lasts more than 6 weeks.    Vaginal discharge that smells bad.    Severe pain, cramping or tenderness in your lower belly area.    A need to urinate more frequently (use the toilet more often), more urgently (use the toilet very quickly), or it burns when you urinate.    Nausea and vomiting.    Redness, swelling or pain around a vein in your leg.    Problems breastfeeding or a red or painful area on your breast.    Chest pain and cough or are gasping for air.    Problems coping with sadness, anxiety, or depression.  If you have any concerns about hurting yourself or the baby, call your provider immediately.     You have questions or concerns after you return home.     Keep your hands clean:  Always wash your hands before touching your perineal area and stitches.  This helps reduce your risk of infection.  If your hands aren't dirty, you may use an alcohol hand-rub to clean your hands. Keep your nails clean and short.        Pending Results     No orders found from 2/18/2018 to 2/21/2018.            Statement of Approval     Ordered          02/23/18 0827  I have reviewed and agree with all the recommendations and orders detailed in this document.  EFFECTIVE NOW     Approved and electronically signed by:  Janell Kim MD             Admission Information     Date & Time Provider Department Dept. Phone    2/20/2018 Kitty Oquendo MD [facility] [phone]      Your Vitals Were  "    Blood Pressure Temperature Respirations Last Period Pulse Oximetry       118/83 98.5  F (36.9  C) (Oral) 17 2017 100%       [employer] Information     [employer] lets you send messages to your doctor, view your test results, renew your prescriptions, schedule appointments and more. To sign up, go to [email or web address] . Click on \"Log in\" on the left side of the screen, which will take you to the Welcome page. Then click on \"Sign up Now\" on the right side of the page.     You will be asked to enter the access code listed below, as well as some personal information. Please follow the directions to create your username and password.     Your access code is: [email or web address]  Expires: 2018  9:30 AM     Your access code will  in 90 days. If you need help or a new code, please call your [place] clinic or [phone].        Care EveryWhere ID     This is your Care EveryWhere ID. This could be used by other organizations to access your [place] medical records  [id number]        Equal Access to Services     [facility] : Claudia Kimball, xiomara mauro, loc perez. So [facility] [phone].    ATENCIÓN: Si habla español, tiene a herman disposición servicios gratuitos de asistencia lingüística. Antonio al [phone].    We comply with applicable federal civil rights laws and [place] laws. We do not discriminate on the basis of race, color, national origin, age, disability, sex, sexual orientation, or gender identity.               Review of your medicines      START taking        Dose / Directions    ibuprofen 600 MG tablet   Commonly known as:  ADVIL/MOTRIN        Dose:  600 mg   Take 1 tablet (600 mg) by mouth every 6 hours as needed for other (cramping)   Quantity:  60 tablet   Refills:  0         CONTINUE these medicines which may have CHANGED, or have new prescriptions. If we are uncertain of the size of tablets/capsules you have at " home, strength may be listed as something that might have changed.        Dose / Directions    acetaminophen 325 MG tablet   Commonly known as:  TYLENOL   This may have changed:    - medication strength  - how much to take  - when to take this        Dose:  650 mg   Take 2 tablets (650 mg) by mouth every 4 hours as needed for mild pain   Quantity:  60 tablet   Refills:  0         CONTINUE these medicines which have NOT CHANGED        Dose / Directions    cyclobenzaprine 5 MG tablet   Commonly known as:  FLEXERIL   Used for:  Back muscle spasm        Dose:  5 mg   Take 1 tablet (5 mg) by mouth 3 times daily as needed for muscle spasms   Quantity:  21 tablet   Refills:  0       doxylamine 25 MG Tabs tablet   Commonly known as:  UNISOM   Used for:  Supervision of normal pregnancy, Nausea and vomiting during pregnancy        Dose:  12.5 mg   Take 0.5 tablets (12.5 mg) by mouth 3 times daily Take with vitamin B6 for nausea/vomiting   Quantity:  28 each   Refills:  1       PRENATAL 19 Chew        CHEW AND SWALLOW 1 T PO D   Refills:  3       pyridOXINE 25 MG tablet   Commonly known as:  vitamin B-6   Used for:  Supervision of normal pregnancy, Nausea and vomiting during pregnancy        Dose:  25 mg   Take 1 tablet (25 mg) by mouth 3 times daily Take with unisom for nausea/vomiting   Quantity:  100 tablet   Refills:  1            Where to get your medicines      These medications were sent to [facility] [place], [place] - [address]  [address] [address]     Phone:  [phone]     acetaminophen 325 MG tablet    ibuprofen 600 MG tablet                Protect others around you: Learn how to safely use, store and throw away your medicines at www.disposemymeds.org.             Medication List: This is a list of all your medications and when to take them. Check marks below indicate your daily home schedule. Keep this list as a reference.      Medications           Morning  Afternoon Evening Bedtime As Needed    acetaminophen 325 MG tablet   Commonly known as:  TYLENOL   Take 2 tablets (650 mg) by mouth every 4 hours as needed for mild pain                                cyclobenzaprine 5 MG tablet   Commonly known as:  FLEXERIL   Take 1 tablet (5 mg) by mouth 3 times daily as needed for muscle spasms                                doxylamine 25 MG Tabs tablet   Commonly known as:  UNISOM   Take 0.5 tablets (12.5 mg) by mouth 3 times daily Take with vitamin B6 for nausea/vomiting                                ibuprofen 600 MG tablet   Commonly known as:  ADVIL/MOTRIN   Take 1 tablet (600 mg) by mouth every 6 hours as needed for other (cramping)   Last time this was given:  800 mg on 2/23/2018  9:05 AM                                PRENATAL 19 Chew   CHEW AND SWALLOW 1 T PO D                                pyridOXINE 25 MG tablet   Commonly known as:  vitamin B-6   Take 1 tablet (25 mg) by mouth 3 times daily Take with unisom for nausea/vomiting

## 2018-02-20 NOTE — IP AVS SNAPSHOT
[facility]    [address] [facility] [address] [address]    Phone:  [phone]                                       After Visit Summary   2/20/2018    Marta Asencio    MRN: [id number]           After Visit Summary Signature Page     I have received my discharge instructions, and my questions have been answered. I have discussed any challenges I see with this plan with the nurse or doctor.    ..........................................................................................................................................  Patient/Patient Representative Signature      ..........................................................................................................................................  Patient Representative Print Name and Relationship to Patient    ..................................................               ................................................  Date                                            Time    ..........................................................................................................................................  Reviewed by Signature/Title    ...................................................              ..............................................  Date                                                            Time

## 2018-02-21 LAB — T PALLIDUM IGG+IGM SER QL: NEGATIVE

## 2018-02-21 PROCEDURE — 25000132 ZZH RX MED GY IP 250 OP 250 PS 637: Performed by: STUDENT IN AN ORGANIZED HEALTH CARE EDUCATION/TRAINING PROGRAM

## 2018-02-21 PROCEDURE — 25000128 H RX IP 250 OP 636: Performed by: STUDENT IN AN ORGANIZED HEALTH CARE EDUCATION/TRAINING PROGRAM

## 2018-02-21 PROCEDURE — 12000032 ZZH R&B OB CRITICAL UMMC

## 2018-02-21 PROCEDURE — 25000125 ZZHC RX 250: Performed by: ANESTHESIOLOGY

## 2018-02-21 PROCEDURE — 25000128 H RX IP 250 OP 636: Performed by: ANESTHESIOLOGY

## 2018-02-21 PROCEDURE — 25000125 ZZHC RX 250: Performed by: OBSTETRICS & GYNECOLOGY

## 2018-02-21 PROCEDURE — 25000132 ZZH RX MED GY IP 250 OP 250 PS 637: Performed by: OBSTETRICS & GYNECOLOGY

## 2018-02-21 RX ORDER — SODIUM CHLORIDE, SODIUM LACTATE, POTASSIUM CHLORIDE, CALCIUM CHLORIDE 600; 310; 30; 20 MG/100ML; MG/100ML; MG/100ML; MG/100ML
INJECTION, SOLUTION INTRAVENOUS
Status: DISCONTINUED
Start: 2018-02-21 | End: 2018-02-22 | Stop reason: HOSPADM

## 2018-02-21 RX ORDER — FENTANYL/BUPIVACAINE/NS/PF 2-1250MCG
PLASTIC BAG, INJECTION (ML) INJECTION
Status: COMPLETED
Start: 2018-02-21 | End: 2018-02-21

## 2018-02-21 RX ORDER — OXYTOCIN/0.9 % SODIUM CHLORIDE 30/500 ML
1-24 PLASTIC BAG, INJECTION (ML) INTRAVENOUS CONTINUOUS
Status: DISCONTINUED | OUTPATIENT
Start: 2018-02-21 | End: 2018-02-22

## 2018-02-21 RX ORDER — LIDOCAINE 40 MG/G
CREAM TOPICAL
Status: DISCONTINUED | OUTPATIENT
Start: 2018-02-21 | End: 2018-02-22

## 2018-02-21 RX ORDER — LIDOCAINE HYDROCHLORIDE AND EPINEPHRINE 15; 5 MG/ML; UG/ML
INJECTION, SOLUTION EPIDURAL PRN
Status: DISCONTINUED | OUTPATIENT
Start: 2018-02-21 | End: 2018-12-11 | Stop reason: HOSPADM

## 2018-02-21 RX ORDER — SODIUM CHLORIDE, SODIUM LACTATE, POTASSIUM CHLORIDE, CALCIUM CHLORIDE 600; 310; 30; 20 MG/100ML; MG/100ML; MG/100ML; MG/100ML
INJECTION, SOLUTION INTRAVENOUS CONTINUOUS
Status: DISCONTINUED | OUTPATIENT
Start: 2018-02-21 | End: 2018-02-22

## 2018-02-21 RX ORDER — FENTANYL CITRATE 50 UG/ML
100 INJECTION, SOLUTION INTRAMUSCULAR; INTRAVENOUS ONCE
Status: COMPLETED | OUTPATIENT
Start: 2018-02-21 | End: 2018-02-21

## 2018-02-21 RX ORDER — EPHEDRINE SULFATE 50 MG/ML
5 INJECTION, SOLUTION INTRAMUSCULAR; INTRAVENOUS; SUBCUTANEOUS
Status: DISCONTINUED | OUTPATIENT
Start: 2018-02-21 | End: 2018-02-22

## 2018-02-21 RX ORDER — NALBUPHINE HYDROCHLORIDE 10 MG/ML
2.5-5 INJECTION, SOLUTION INTRAMUSCULAR; INTRAVENOUS; SUBCUTANEOUS EVERY 6 HOURS PRN
Status: DISCONTINUED | OUTPATIENT
Start: 2018-02-21 | End: 2018-02-22

## 2018-02-21 RX ORDER — EPHEDRINE SULFATE 50 MG/ML
INJECTION, SOLUTION INTRAMUSCULAR; INTRAVENOUS; SUBCUTANEOUS
Status: DISCONTINUED
Start: 2018-02-21 | End: 2018-02-22 | Stop reason: HOSPADM

## 2018-02-21 RX ORDER — NALOXONE HYDROCHLORIDE 0.4 MG/ML
.1-.4 INJECTION, SOLUTION INTRAMUSCULAR; INTRAVENOUS; SUBCUTANEOUS
Status: DISCONTINUED | OUTPATIENT
Start: 2018-02-21 | End: 2018-02-22

## 2018-02-21 RX ADMIN — SODIUM CHLORIDE, POTASSIUM CHLORIDE, SODIUM LACTATE AND CALCIUM CHLORIDE: 600; 310; 30; 20 INJECTION, SOLUTION INTRAVENOUS at 22:48

## 2018-02-21 RX ADMIN — SODIUM CHLORIDE, POTASSIUM CHLORIDE, SODIUM LACTATE AND CALCIUM CHLORIDE 1000 ML: 600; 310; 30; 20 INJECTION, SOLUTION INTRAVENOUS at 22:05

## 2018-02-21 RX ADMIN — CALCIUM CARBONATE (ANTACID) CHEW TAB 500 MG 1000 MG: 500 CHEW TAB at 00:02

## 2018-02-21 RX ADMIN — Medication 25 MCG: at 05:04

## 2018-02-21 RX ADMIN — OXYTOCIN-SODIUM CHLORIDE 0.9% IV SOLN 30 UNIT/500ML 1 MILLI-UNITS/MIN: 30-0.9/5 SOLUTION at 21:30

## 2018-02-21 RX ADMIN — CALCIUM CARBONATE (ANTACID) CHEW TAB 500 MG 1000 MG: 500 CHEW TAB at 21:37

## 2018-02-21 RX ADMIN — Medication 25 MCG: at 03:01

## 2018-02-21 RX ADMIN — SODIUM CHLORIDE, POTASSIUM CHLORIDE, SODIUM LACTATE AND CALCIUM CHLORIDE: 600; 310; 30; 20 INJECTION, SOLUTION INTRAVENOUS at 21:26

## 2018-02-21 RX ADMIN — Medication: at 22:40

## 2018-02-21 RX ADMIN — Medication 10 ML/HR: at 22:42

## 2018-02-21 RX ADMIN — Medication 25 MCG: at 07:03

## 2018-02-21 RX ADMIN — FENTANYL CITRATE 100 MCG: 50 INJECTION, SOLUTION INTRAMUSCULAR; INTRAVENOUS at 12:46

## 2018-02-21 RX ADMIN — LIDOCAINE HYDROCHLORIDE,EPINEPHRINE BITARTRATE 3 ML: 15; .005 INJECTION, SOLUTION EPIDURAL; INFILTRATION; INTRACAUDAL; PERINEURAL at 22:42

## 2018-02-21 RX ADMIN — Medication 25 MCG: at 00:41

## 2018-02-21 NOTE — PROGRESS NOTES
[facility]  Labor Progress Note    S: Patient is doing well overall, not significant discomfort with contractions.  Is very anxious about cook catheter placement.     O:   Patient Vitals for the past 4 hrs:   BP Temp Temp src Resp   18 1005 119/72 98.5  F (36.9  C) Oral 16       SVE: 1.5/50/-4, posterior, soft     FHT: Baseline 140, moderate variability, accelerations 15x15 present, absent decelerations  [place]: Contractions every 2-4 minutes     A/P:  Marta Asencio is a 32 year old  at 40w2d by 9w4d US admitted for elective IOL. History of 2 prior NSVDs. Pregnancy uncomplicated.    Labor:   - IOL; s/p 5 PO miso, last around 0700.  Cook catheter placed at 1300 with 80cc intrauterine balloon.  Will put on traction.  Continue PO misoprostol concurrently with cook if contractions allow.  Reassess after cook is expelled  - Pain: per patient     FWB:   - Category I FHT, reactive   - Continuous EFM     PNC:   - Rh positive, Rubella immune, GBS negative, GCT 75, Placenta posterior     Other:   - History of a LEEP, do not mention in front of FOB  - FOB with sickle cell trait     Nasrin Carpenter MD   OB/Gyn Resident, PGY-4  2018, 1:03 PM

## 2018-02-21 NOTE — ANESTHESIA PREPROCEDURE EVALUATION
Anesthesia Evaluation       history and physical reviewed .             ROS/MED HX    ENT/Pulmonary:     (+)tobacco use, Past use , . .    Neurologic:  - neg neurologic ROS     Cardiovascular:  - neg cardiovascular ROS   (+) ----. : . . . :. . Previous cardiac testing date:results:date: results:ECG reviewed date: results:17: NSR date: results:          METS/Exercise Tolerance:     Hematologic:  - neg hematologic  ROS       Musculoskeletal:  - neg musculoskeletal ROS       GI/Hepatic:  - neg GI/hepatic ROS       Renal/Genitourinary:  - ROS Renal section negative       Endo:     (+) Obesity, .      Psychiatric:  - neg psychiatric ROS       Infectious Disease:  - neg infectious disease ROS       Malignancy:      - no malignancy   Other:                     Physical Exam  Normal systems: cardiovascular and pulmonary    Airway   Mallampati: II  TM distance: >3 FB  Neck ROM: full    Dental     Cardiovascular   Rhythm and rate: regular and normal      Pulmonary    breath sounds clear to auscultation    Other findings:  @ 40w2d for IOL; FOB sickle cell trait; Hx of LEEP      neg OB ROS                 Lab Results   Component Value Date    WBC 7.4 2018    WBC 6.4 2017    WBC 7.4 2017    HGB 11.1 (L) 2018    HGB 11.6 (L) 2017    HGB 13.7 2017    HCT 33.9 (L) 2018    HCT 41.0 2017    HCT 40.4 2017     2018     2017     2017     2017     2008    POTASSIUM 3.5 2017    POTASSIUM 3.1 (L) 2008    POTASSIUM 3.1@ 2008    CHLORIDE 103 2017    CHLORIDE 103 2008    CO2 25 2017    CO2 23 2008    BUN 9 2017    BUN 7 2008    CR 0.69 2017    CR 0.91 2008    CR 0.91@ 2008    GLC 57 (L) 2017    GLC 98 2008    GLC 98@ 2008    DD 0.4 2011    DD 0.5 2008    AST 16 2008    AST 16@ 2008    ALT 8 2008     ALT 8@ 11/17/2008    ALKPHOS 71 11/17/2008    BILITOTAL 0.6 11/17/2008         Anesthesia Plan      History & Physical Review      ASA Status:  2 .  OB Epidural Asa: 2       Plan for Epidural       -Notified by OB resident that patient will want an epidural  -OB resident to contact anesthesia when ready for epidural placement  -Will consent patient and order medications once contacted by OB resident for placement    Alena Nice MD  CA 3 Resident  Pager [phone]      Postoperative Care      Consents  Anesthetic plan, risks, benefits and alternatives discussed with:  Patient and Patient..

## 2018-02-21 NOTE — PROGRESS NOTES
Comfortable; not feeling contractions.  No complaints.    /71  Temp 98.4  F (36.9  C) (Oral)  Resp 18  LMP 05/20/2017    Category 1 tracing.    Cervix posterior, thick, unchanged.  + small bloody mucus show     Will give 6th misoprostol and then Moreno catheter, Iv pitocin augmentation.  Continue IOL.    Carmen Ingram MD

## 2018-02-21 NOTE — PLAN OF CARE
Problem: Patient Care Overview  Goal: Plan of Care/Patient Progress Review  Outcome: Therapy, progress toward functional goals is gradual  [place]ake every 2-4 min, mild quality. FHT's 130 with moderate variability and accelerations. No decelerations noted. VSS. Cook placed easily by Dr. Carpenter with 80 ml in uterus. Fentanyl 100 mg was given prior to insertion and patient tolerated the procedure well. Questionable SROM. Will continue to monitor. Offers no complaints. Plan .

## 2018-02-21 NOTE — H&P
[facility] Labor and Delivery History and Physical    Marta Asencio MRN# [id number]   Age: 32 year old YOB: 1985     Date of Admission:  2018    Primary care provider: Sonal Esqueda         HPI:   Marta Asencio is a 32 year old  at 40w2d by 9w4d US who presents for a scheduled elective IOL. Denies vaginal bleeding or loss of fluid. Notes normal fetal movement. Last cervical exam was 1.5/60/-4 on . Feeling some occasional contractions, nothing painful.     Pregnancy complicated by:  1. History of a LEEP, do not mention in front of FOB  2. FOB with sickle cell trait     ROS: Denies headache, vision changes, cough, dyspnea, chest pain, abdominal pain, nausea, vomiting, abnormal vaginal discharge or dysuria.          Pregnancy history:     OBSTETRIC HISTORY:    Obstetric History       T2      L2     SAB1   TAB2   Ectopic0   Multiple0   Live Births2       # Outcome Date GA Lbr Gumaro/2nd Weight Sex Delivery Anes PTL Lv   6 Current            5 TAB 2008           4 SAB            3 Term 07 39w0d  3.43 kg (7 lb 9 oz) F    LASHONDA      Name: Inocente Patrick Term 03 38w0d 18:00 2.977 kg (6 lb 9 oz) F    LASHONDA   1 TAB                 Prenatal Labs:   Lab Results   Component Value Date    ABO AB 2018    RH Pos 2018    AS Neg 2017    HEPBANG Nonreactive 2017    CHPCRT  08/10/2017     Negative   Negative for C. trachomatis rRNA by transcription mediated amplification.   A negative result by transcription mediated amplification does not preclude the   presence of C. trachomatis infection because results are dependent on proper   and adequate collection, absence of inhibitors, and sufficient rRNA to be   detected.      GCPCRT  08/10/2017     Negative   Negative for N. gonorrhoeae rRNA by transcription mediated amplification.   A negative result by transcription mediated amplification does not  preclude the   presence of N. gonorrhoeae infection because results are dependent on proper   and adequate collection, absence of inhibitors, and sufficient rRNA to be   detected.      TREPAB Negative 07/24/2017    RUBELLAABIGG 27 11/03/2006    HGB 11.1 (L) 02/20/2018    HIV Negative 11/03/2006     GBS Status:   Lab Results   Component Value Date    GBS Negative 01/23/2018     Active Problem List  Patient Active Problem List   Diagnosis     Need for Tdap vaccination     Encounter for supervision of other normal pregnancy in 3rd  trimester     Labor and delivery indication for care or intervention     Medication Prior to Admission  Prescriptions Prior to Admission   Medication Sig Dispense Refill Last Dose     Prenatal Vit-Fe Fumarate-FA (PRENATAL 19) CHEW CHEW AND SWALLOW 1 T PO D  3 2/19/2018 at Unknown time     acetaminophen (TYLENOL) 500 MG tablet Take 2 tablets (1,000 mg) by mouth every 6 hours as needed for mild pain 50 tablet 0 Past Month at Unknown time     cyclobenzaprine (FLEXERIL) 5 MG tablet Take 1 tablet (5 mg) by mouth 3 times daily as needed for muscle spasms 21 tablet 0 Past Week at Unknown time     pyridOXINE (VITAMIN B-6) 25 MG tablet Take 1 tablet (25 mg) by mouth 3 times daily Take with unisom for nausea/vomiting (Patient not taking: Reported on 12/11/2017) 100 tablet 1 More than a month at Unknown time     doxylamine (UNISOM) 25 MG TABS tablet Take 0.5 tablets (12.5 mg) by mouth 3 times daily Take with vitamin B6 for nausea/vomiting (Patient not taking: Reported on 8/10/2017) 28 each 1 More than a month at Unknown time   taking flexeril once a week        Maternal Past Medical History:     Past Medical History:   Diagnosis Date     NO ACTIVE PROBLEMS         Maternal Past Surgical History:     Past Surgical History:   Procedure Laterality Date     LEEP TX, CERVICAL N/A 09/09/2005    FREIDA 3 of cervix              Family History:     Family History   Problem Relation Age of Onset     Alcohol/Drug  Mother      Depression Mother      GERD Mother      MENTAL ILLNESS Mother      GASTROINTESTINAL DISEASE Father      hepatitis     Depression Father      DIABETES Father      Colon Cancer Father      Tuberculosis Father      Hyperlipidemia Father      MENTAL ILLNESS Father      Respiratory Daughter      POSSIBLE ASTHSMA     CEREBROVASCULAR DISEASE Maternal Grandfather             Social History:   Lives with Destin CHILEL. Denies tobacco, ETOH or drug use during pregnancy.         Physical Exam:     Vitals:    18 2049   BP: 119/74   Resp: 18   Temp: 98.6  F (37  C)   TempSrc: Oral     Gen: Well appearing, no apparent distress  Cardio: RRR, no murmurs  Resp: CTAB, no wheezes  Abdomen: gravid, soft, nontender. EFW 8.5 lbs  Cervix: 1.5/60/-3, medium, mid  Benz score: 5  Presentation:Cephalic by BSUS    Fetal Heart Rate Tracing: Baseline 140, moderate variability, accels present, no decels  Tocometer: 2 contractions in 10 minutes    Imaging:    Dating Ultrasound   GA: 9w4d      Single IUP, +Fetal cardiac activity   Fetal Anatomy Survey   GA: 21w4d      Single IUP, Fetal Anatomy WNL, 3 VC, Placenta: posterior, nml TREV     Growth US at 36w2d (): cephalic, EFW 3006g (6lb 10oz), 72%, AC 70%, TREV 11.9cm        Assessment:   Marta Asencio is a 32 year old  at 40w2d by 9w4d US admitted for elective IOL. History of 2 prior NSVDs. Pregnancy uncomplicated.        Plan:     #IOL  - Admit to L&D. Discussed given unfavorable cervix, possibility of a longer induction. Patient offered to return at 41 weeks and declines. Cervical ripening with PO misoprostol.   - Plans epidural for labor analgesia  - Hgb 11.1, plt 206, AB positive, RPR pending  - GBS negative    #Fetal Wellbeing  -EFW 8.5 lbs  -Category I, reactive.  Continue monitoring per protocol    Andreina Wilkerson MD  OB/GYN PGY2      Physician Attestation   I, Lilia Hall, personally examined and evaluated this patient.  I discussed the patient with  the resident and care team, and agree with the assessment and plan of care as documented in the resident s note of 2018  [date].      I personally reviewed vital signs, medications, labs, imaging and exam and fetal tracing. .    Key findings: 32 year old  at 40w2d here for elective IOL. Category 1 fetal tracing. EFW 8.5lb. Cervix 1.5/60/-3/med/mid. Discussed risks and benefits of elective IOL. Desires IOL. Plan to prep with PO miso. Anticipate .   Lilia Hall MD  Date of Service (when I saw the patient): 18

## 2018-02-21 NOTE — PROGRESS NOTES
[facility]  Labor Progress Note    S:  Patient starting to feel some cramping.    O:   Patient Vitals for the past 4 hrs:   BP Temp   18 0300 102/62 -   18 0042 - 98.4  F (36.9  C)   18 0041 101/59 -     SVE: 1.5/60/-2    FHT: Baseline 140, mod variability, accelerations present, no decelerations  [place]: 2-3 contractions in 10 minutes    A/P:  Ms. Marta Asencio is a 32 year old  at 40w3d by 9w4d US, here for an elective IOL.    Labor: - s/p 3 doses of misoprostol, cervix remains unfavorable   - continue with PO misoprostol   - recheck after regular, painful contractions     FWB: - Category I FHT  PNC: - Rh positive, Rubella immune, GBS negative, GCT passed, Placenta posterior    Andreina Wilkerson MD  Ob/Gyn, PGY2

## 2018-02-21 NOTE — PLAN OF CARE
Problem: Patient Care Overview  Goal: Plan of Care/Patient Progress Review  Outcome: Therapy, progress toward functional goals as expected  Vital signs stable. Patient feeling cramps and dax every 2-8 minutes. FHT's moderate variability with accelerations, no decelerations. Patient has received 4 doses of oral misoprostol. Explained induction plan to patient, she has no questions at this time. She would eventually like an epidural and will notify staff when she is ready. Significant other at bedsideWill continue with current plan of care.

## 2018-02-21 NOTE — PLAN OF CARE
Patient arrived for induction. Vitals signs stable. Patient denies any headaches or changes in vision. Intact and denies any bleeding. FHR moderate variability with accelerations, no decelerations. Patient not feeling any contractions. Plan is to start with oral misoprostol. IV placed as patient would like an epidural at some point. Significant other at bedside. Patient educated on induction and signs of labor. Call light within arms reach. Will continue with current plan of care.

## 2018-02-21 NOTE — PLAN OF CARE
Patient called out with possible SROM. Blood and possibly fluid on green  pad. Linen change and a towel placed underneath patient for observation. Dr Carpenter notified, plan for continued observation at this point.

## 2018-02-22 PROCEDURE — 12000030 ZZH R&B OB INTERMEDIATE UMMC

## 2018-02-22 PROCEDURE — 25000125 ZZHC RX 250: Performed by: OBSTETRICS & GYNECOLOGY

## 2018-02-22 PROCEDURE — 72200001 ZZH LABOR CARE VAGINAL DELIVERY SINGLE

## 2018-02-22 PROCEDURE — 25000125 ZZHC RX 250: Performed by: STUDENT IN AN ORGANIZED HEALTH CARE EDUCATION/TRAINING PROGRAM

## 2018-02-22 PROCEDURE — 10907ZC DRAINAGE OF AMNIOTIC FLUID, THERAPEUTIC FROM PRODUCTS OF CONCEPTION, VIA NATURAL OR ARTIFICIAL OPENING: ICD-10-PCS | Performed by: OBSTETRICS & GYNECOLOGY

## 2018-02-22 PROCEDURE — 25000132 ZZH RX MED GY IP 250 OP 250 PS 637: Performed by: OBSTETRICS & GYNECOLOGY

## 2018-02-22 PROCEDURE — 59400 OBSTETRICAL CARE: CPT | Mod: GC | Performed by: OBSTETRICS & GYNECOLOGY

## 2018-02-22 RX ORDER — MISOPROSTOL 200 UG/1
400 TABLET ORAL
Status: DISCONTINUED | OUTPATIENT
Start: 2018-02-22 | End: 2018-02-23 | Stop reason: HOSPADM

## 2018-02-22 RX ORDER — IBUPROFEN 800 MG/1
800 TABLET, FILM COATED ORAL EVERY 6 HOURS PRN
Status: DISCONTINUED | OUTPATIENT
Start: 2018-02-22 | End: 2018-02-23 | Stop reason: HOSPADM

## 2018-02-22 RX ORDER — MISOPROSTOL 200 UG/1
TABLET ORAL
Status: DISCONTINUED
Start: 2018-02-22 | End: 2018-02-22 | Stop reason: HOSPADM

## 2018-02-22 RX ORDER — LANOLIN 100 %
OINTMENT (GRAM) TOPICAL
Status: DISCONTINUED | OUTPATIENT
Start: 2018-02-22 | End: 2018-02-23 | Stop reason: HOSPADM

## 2018-02-22 RX ORDER — IBUPROFEN 600 MG/1
600 TABLET, FILM COATED ORAL EVERY 6 HOURS PRN
Qty: 60 TABLET | Refills: 0 | Status: SHIPPED | OUTPATIENT
Start: 2018-02-22

## 2018-02-22 RX ORDER — ACETAMINOPHEN 325 MG/1
650 TABLET ORAL EVERY 4 HOURS PRN
Qty: 60 TABLET | Refills: 0 | Status: SHIPPED | OUTPATIENT
Start: 2018-02-22

## 2018-02-22 RX ORDER — ACETAMINOPHEN 325 MG/1
650 TABLET ORAL EVERY 4 HOURS PRN
Status: DISCONTINUED | OUTPATIENT
Start: 2018-02-22 | End: 2018-02-23 | Stop reason: HOSPADM

## 2018-02-22 RX ORDER — LIDOCAINE HYDROCHLORIDE 10 MG/ML
INJECTION, SOLUTION EPIDURAL; INFILTRATION; INTRACAUDAL; PERINEURAL
Status: DISCONTINUED
Start: 2018-02-22 | End: 2018-02-22 | Stop reason: HOSPADM

## 2018-02-22 RX ORDER — OXYTOCIN/0.9 % SODIUM CHLORIDE 30/500 ML
PLASTIC BAG, INJECTION (ML) INTRAVENOUS
Status: DISCONTINUED
Start: 2018-02-22 | End: 2018-02-22 | Stop reason: HOSPADM

## 2018-02-22 RX ORDER — ONDANSETRON 2 MG/ML
4 INJECTION INTRAMUSCULAR; INTRAVENOUS EVERY 6 HOURS PRN
Status: DISCONTINUED | OUTPATIENT
Start: 2018-02-22 | End: 2018-02-23 | Stop reason: HOSPADM

## 2018-02-22 RX ORDER — OXYTOCIN/0.9 % SODIUM CHLORIDE 30/500 ML
340 PLASTIC BAG, INJECTION (ML) INTRAVENOUS CONTINUOUS PRN
Status: DISCONTINUED | OUTPATIENT
Start: 2018-02-22 | End: 2018-02-23 | Stop reason: HOSPADM

## 2018-02-22 RX ORDER — OXYTOCIN/0.9 % SODIUM CHLORIDE 30/500 ML
100 PLASTIC BAG, INJECTION (ML) INTRAVENOUS CONTINUOUS
Status: DISCONTINUED | OUTPATIENT
Start: 2018-02-22 | End: 2018-02-23 | Stop reason: HOSPADM

## 2018-02-22 RX ORDER — HYDROCORTISONE 2.5 %
CREAM (GRAM) TOPICAL 3 TIMES DAILY PRN
Status: DISCONTINUED | OUTPATIENT
Start: 2018-02-22 | End: 2018-02-23 | Stop reason: HOSPADM

## 2018-02-22 RX ORDER — OXYTOCIN 10 [USP'U]/ML
10 INJECTION, SOLUTION INTRAMUSCULAR; INTRAVENOUS
Status: DISCONTINUED | OUTPATIENT
Start: 2018-02-22 | End: 2018-02-23 | Stop reason: HOSPADM

## 2018-02-22 RX ORDER — NALOXONE HYDROCHLORIDE 0.4 MG/ML
.1-.4 INJECTION, SOLUTION INTRAMUSCULAR; INTRAVENOUS; SUBCUTANEOUS
Status: DISCONTINUED | OUTPATIENT
Start: 2018-02-22 | End: 2018-02-23 | Stop reason: HOSPADM

## 2018-02-22 RX ORDER — OXYTOCIN 10 [USP'U]/ML
INJECTION, SOLUTION INTRAMUSCULAR; INTRAVENOUS
Status: DISCONTINUED
Start: 2018-02-22 | End: 2018-02-22 | Stop reason: HOSPADM

## 2018-02-22 RX ORDER — AMOXICILLIN 250 MG
1 CAPSULE ORAL 2 TIMES DAILY PRN
Status: DISCONTINUED | OUTPATIENT
Start: 2018-02-22 | End: 2018-02-23 | Stop reason: HOSPADM

## 2018-02-22 RX ORDER — AMOXICILLIN 250 MG
2 CAPSULE ORAL 2 TIMES DAILY PRN
Status: DISCONTINUED | OUTPATIENT
Start: 2018-02-22 | End: 2018-02-23 | Stop reason: HOSPADM

## 2018-02-22 RX ORDER — BISACODYL 10 MG
10 SUPPOSITORY, RECTAL RECTAL DAILY PRN
Status: DISCONTINUED | OUTPATIENT
Start: 2018-02-24 | End: 2018-02-23 | Stop reason: HOSPADM

## 2018-02-22 RX ORDER — HYDROXYZINE HYDROCHLORIDE 50 MG/1
100 TABLET, FILM COATED ORAL
Status: DISCONTINUED | OUTPATIENT
Start: 2018-02-22 | End: 2018-02-22

## 2018-02-22 RX ADMIN — OXYTOCIN-SODIUM CHLORIDE 0.9% IV SOLN 30 UNIT/500ML 340 ML/HR: 30-0.9/5 SOLUTION at 07:26

## 2018-02-22 RX ADMIN — IBUPROFEN 800 MG: 800 TABLET ORAL at 16:35

## 2018-02-22 RX ADMIN — IBUPROFEN 800 MG: 800 TABLET ORAL at 08:18

## 2018-02-22 RX ADMIN — IBUPROFEN 800 MG: 800 TABLET ORAL at 21:59

## 2018-02-22 RX ADMIN — OXYTOCIN-SODIUM CHLORIDE 0.9% IV SOLN 30 UNIT/500ML 340 ML/HR: 30-0.9/5 SOLUTION at 06:51

## 2018-02-22 NOTE — PLAN OF CARE
Problem: Labor (Cervical Ripen, Induct, Augment) (Adult,Obstetrics,Pediatric)  Goal: Signs and Symptoms of Listed Potential Problems Will be Absent, Minimized or Managed (Labor)  Signs and symptoms of listed potential problems will be absent, minimized or managed by discharge/transition of care (reference Labor (Cervical Ripen, Induct, Augment) (Adult,Obstetrics,Pediatric) CPG).   Outcome: Improving  Pt VSS. Afebrile. Pt reported leaking watery/bloody discharge at 1330- possible SROM. Continues to leak brown/bloody watery discharge. Cook catheter deflated and removed at 2045. SVE 3/50/-3. IV pitocin started at 2130. Pt requested labor epidural shortly after pitocin started- placed at bedside at 2230. Pt resting comfortably in bed with good relief from epidural at this time. FHR category 1. See flowsheets for EFM and toco interpretations. Continue with current plan of care.

## 2018-02-22 NOTE — PROGRESS NOTES
[facility]  Labor Progress Note    S: Feeling more pressure with contractions. Feeling dehydrated. Nervous about increase in pain with pushing.     O:   Patient Vitals for the past 4 hrs:   BP Temp Temp src Resp SpO2   18 0530 111/65 98.7  F (37.1  C) Oral 18 99 %   18 0410 129/78 98.1  F (36.7  C) Oral 18 98 %   18 0230 124/80 - - - 100 %   18 0200 104/66 - - - 98 %     SVE: 100/10/+1    FHT: Baseline 140, moderate variability, accelerations present, no decelerations  [place]: 1-2 contractions in 10 minutes    A/P:  Ms. Marta Asencio is a 32 year old  at 40w3d by 9w4d US, here for an elective IOL.      Labor:             - discussed laboring down versus pushing. Patient desired to try pushing and had little movement with contractions. She also felt overwhelmed and dizzy with pushing. Discussed laboring down and taking time to prepare mentally for delivery.                         - comfortable with an epidural      FWB:              - Category I FHT, reactive    PNC:               - Rh positive, Rubella immune, GBS negative, GCT passed, Placenta posterior     Andreina Wilkerson MD  Ob/Gyn, PGY2

## 2018-02-22 NOTE — PROVIDER NOTIFICATION
02/22/18 0459   Provider Notification   Provider Name/Title Dr Wilkerson   Method of Notification Electronic Page   Request Evaluate in Person   Pt reports increased pressure- please come assess.

## 2018-02-22 NOTE — PLAN OF CARE
Problem: Labor (Cervical Ripen, Induct, Augment) (Adult,Obstetrics,Pediatric)  Goal: Signs and Symptoms of Listed Potential Problems Will be Absent, Minimized or Managed (Labor)  Signs and symptoms of listed potential problems will be absent, minimized or managed by discharge/transition of care (reference Labor (Cervical Ripen, Induct, Augment) (Adult,Obstetrics,Pediatric) CPG).   Outcome: Therapy, progress toward functional goals as expected  Afebrile, VSS. Pt currently laboring down and has been for almost 1 hour; will reevaluate @ 0630 and decide whether to continue 1 more hour of laboring down or begin pushing. Pt having scant LOF and bldg.  w/minimal to mod variability, +acels, occasional ED. Ctx q 2-4 min on Pitocin. Epidural worked well until pt started feeling pressure. Anticipate . Continue to monitor.

## 2018-02-22 NOTE — ANESTHESIA PROCEDURE NOTES
Epidural Procedure Note    Staff:     Anesthesiologist:  ANAND ALLEN  Location: OB     Procedure start time:  2/21/2018 10:30 PM   Pre-procedure checklist:   patient identified, IV checked, site marked, risks and benefits discussed, informed consent, monitors and equipment checked, pre-op evaluation, at physician/surgeon's request and post-op pain management      Correct Patient: Yes      Correct Position: Yes      Correct Site: Yes      Correct Procedure: Yes      Correct Laterality:  Yes    Site Marked:  Yes  Procedure:     Procedure:  Epidural catheter    ASA:  2    Position:  Sitting    Sterile Prep: chloraprep      Insertion site:  L2-3    Local skin infiltration:  1% lidocaine    amount (mL):  2    Approach:  Midline    Needle gauge (G):  17    Needle Length (in):  3.5    Block Needle Type:  Touhy    Injection Technique:  LORT saline    LUZ at (cm):  5    Attempts:  1    Redirects:  0    Catheter gauge (G):  19    Catheter threaded easily: Yes      Threaded to cm at skin:  8    Threaded in epidural space (cm):  3    Paresthesias:  No    Aspiration negative for Heme or CSF: Yes      Test dose (mL):  3     Local anesthetic:  Lidocaine 1.5% w/ 1:200,000 epinephrine    Test dose time:  22:42    Test dose negative for signs of intravascular, subdural or intrathecal injection: Yes

## 2018-02-22 NOTE — L&D DELIVERY NOTE
Delivery Summary    Marta Asencio is a 32 year old  at 40w4d by 9w4d US who was admitted for an elective IOL on 2018. Pregnancy was notable for history of a LEEP. Upon admission her cervix was 1.5/60/-3. She received misoprostol, a cook catheter and pitocin for augmentation. She received an epidural with good pain relief. AROM was performed at 0130 with clear fluid. She was complete at 0508 and began pushing at 0630. At 0650 a vigorous female infant was delivered in the ANDREINA position with apgars of 8 and 9. Weight 3941g. Delayed cord clamping was done for >60 seconds. The placenta delivered spontaneously, intact with a 3 vessel cord at 0656. Pitocin was given after delivery of the baby. No lacerations were present. QBL of 200mL. Sponge and sharp counts were correct. Dr. Ingram was present for delivery.     Delivery Summary with [facility]  DELIVERY SUMMARY    Marta Asencio MRN# [id number]   Age: 32 year old YOB: 1985     ASSESSMENT & PLAN: stable to postpartum    Dodson Assessment Tool Data    Gestational Age:  Gestational Age: 40w4d     Maternal temperature range:  Temp  Av.3  F (36.8  C)  Min: 97.9  F (36.6  C)  Max: 98.8  F (37.1  C)    Membranes ruptured for:   5h 25m     GBS status:  Lab Results   Component Value Date    GBS Negative 2018       Antibiotic Status:  Antibiotics         IV Antibiotic Given         Additional Management      Fetal Status Prior to  Delivery Category 1    Fetal Status Comments         Sepsis Prebirth Score:       Sepsis Postbirth Score:       Determination based on clinical exam after birth:       Disposition:            Labor Event Times    Labor onset date:  18 Onset time:   1:30 AM   Dilation complete date:  18 Complete time:   5:08 AM   Start pushing date/time:  2018 0630            Labor Length    1st Stage (hrs):  3 (min):  38   2nd Stage (hrs):  1 (min):  42   3rd Stage (hrs):  0 (min):  6      Labor  Events     labor?:  No    steroids:  None   Labor Type:  Induction   Predominate monitoring during 1st stage:  continuous electronic fetal monitoring      Antibiotics received during labor?:  No      Rupture date/time: 18 0125   Rupture type:  Artificial Rupture of Membranes   Fluid color:  Clear   Fluid odor:  Normal      Induction:  Misoprostol, Mechanical ripening agent   Induction date/time:     Cervical ripening date/time:     Indications for induction:  Elective      1:1 continuous labor support provided by?:  RN          Delivery/Placenta Date and Time    Delivery Date:  18 Delivery Time:   6:50 AM   Placenta Date/Time:  2018  6:56 AM   Oxytocin given at the time of delivery:  after delivery of baby      Vaginal Counts    Initial count performed by 2 team members:   Two Team Members   MD JOSE Talbot MD          Needles Suture [place] Sponges Instruments   Initial counts 2  5    Added to count       Final counts 2  5       Placed during labor Accounted for at the end of labor      Final count performed by 2 team members:   Two Team Members   MD [place] Talbot RN         Final count correct?:  Yes         Apgars    Living status:  Living    1 Minute 5 Minute 10 Minute 15 Minute 20 Minute   Skin color: 1  1       Heart rate: 2  2       Reflex irritability: 2  2       Muscle tone: 2  2       Respiratory effort: 1  2       Total: 8  9          Apgars assigned by:  RAFAEL RUSSELL RN      Cord    Vessels:  3 Vessels Complications:  Nuchal   Cord Blood Disposition:  Lab Gases Sent?:  No          Resuscitation    Methods:  None      Encampment Care at Delivery:  Baby to mother's abdomen after delivery. Lusty cry after stimulation. Dried and covered with warm blankets.      Skin to Skin and Feeding Plan    Skin to skin initiation date/time: 18 0650   Skin to skin with:  Mother   Skin to skin end date/time:        Labor Events and Shoulder Dystocia    Fetal Tracing  Prior to Delivery:  Category 1   Shoulder dystocia present?:  Neg            Delivery (Maternal) (Provider to Complete) (750375)    Episiotomy:  None   Perineal lacerations:  None          Mother's Information  Mother: Marta Asencio #[id number]    Start of Mother's Information     IO Blood Loss  02/22/18 0130 - 02/23/18 0413    Mom's I/O Activity            End of Mother's Information  Mother: Marta Asencio #[id number]            Delivery - Provider to Complete ([id number])    Delivering clinician:  VAMSHI NG   Attempted Delivery Types (Choose all that apply):  Spontaneous Vaginal Delivery   Delivery Type (Choose the 1 that will go to the Birth History):  Vaginal, Spontaneous Delivery                     Other personnel:   Provider Role   LUIS ASTORGA Resident            Placenta    Delayed Cord Clamping:  Done   Date/Time:  2/22/2018  6:56 AM   Removal:  Spontaneous   Disposition:  Hospital disposal      Anesthesia    Method:  Epidural   Cervical dilation at placement:  0-3         Presentation and Position    Presentation:  Vertex   Position:  Right Occiput Anterior                    Luis Astorga MD      I was scrubbed and present for this delivery.  I agree with the delivery summary as written by Dr. Astorga.    Vamshi Ng MD  [facility] [place] [facility]  2/23/208  1:02 PM

## 2018-02-22 NOTE — PLAN OF CARE
Patient arrived to [facility] unit via wheelchair at 0915,with belongings, accompanied by spouse/ significant other, with infant in arms. Bedside report received report from Courtney ASTORGA and checked bands. Unit and room orientation completd. Call light given; no concerns present at this time. Continue with plan of care.

## 2018-02-22 NOTE — PROGRESS NOTES
[facility]  Labor Progress Note    S:  Patient comfortable with an epidural.    O:   Patient Vitals for the past 4 hrs:   BP Temp Temp src Resp SpO2   18 0100 120/73 - - - 99 %   18 0000 100/63 - - - 99 %   18 2330 119/69 - - - 100 %   18 2325 - 98.4  F (36.9  C) Oral 16 100 %   18 2300 104/59 - - - 99 %   18 2257 115/60 - - - -   18 2256 111/57 - - - 99 %   18 2252 113/60 - - - -   18 2251 110/60 - - - 100 %   18 2246 114/65 - - - 100 %   18 2244 - - - - 100 %   18 2240 114/65 - - - -   18 2239 124/75 - - - -   18 2237 117/82 - - - -   18 2235 121/77 - - - -   18 2233 122/75 - - - 100 %   18 2231 122/75 - - - 100 %   18 2226 133/82 - - - -   18 2133 - 98.2  F (36.8  C) Oral - -     SVE: 3.5/90/-2    FHT: Baseline 140, moderate variability, accelerations present, no decelerations  [place]: 2 contractions in 10 minutes    A/P:  Ms. Marta Asencio is a 32 year old  at 40w3d by 9w4d US, here for an elective IOL.     Labor:             - s/p 5 doses of misoprostol and a cook catheter             - continue pitocin, IV infiltrated, now replaced and running again             - AROM with clear fluid at 0130                        - comfortable with an epidural      FWB:              - Category I FHT, reactive    PNC:               - Rh positive, Rubella immune, GBS negative, GCT passed, Placenta posterior     Andreina Wilkerson MD  Ob/Gyn, PGY2

## 2018-02-22 NOTE — PROVIDER NOTIFICATION
02/21/18 2037   Provider Notification   Provider Name/Title Dr. Wilkerson   Method of Notification Electronic Page   Request Evaluate in Person   Notification Reason Status Update   Pt called out with increase in vaginal pressure. Tried to remove cook catheter with traction, but patient too uncomfortable to continue. Vaginal bleeding present more than bloody show. Dr. Wilkerson asked to come evaluate patient. Dr. Ingram to bedside to evaluate. 80cc deflated from uterine balloon and cook catheter removed. SVE at 2045 3cm. Plan to start IV pitocin

## 2018-02-22 NOTE — PLAN OF CARE
Data: Marta Asencio had a vaginal delivery at 0650. Fundus firm 2/U. Scant-Small amount of lochia. Vitals stable. Bottle feeding baby.  Ambulated well. Voided without difficulty.     Intervention: Postpartum cares started. Epidural discontinued and tip intact. Postpartum pitocin given after placenta was delivered and is infusing at 100ml/hour. Continue fundal/VS/lochia assessment every 15 min x8. Report given to postpartum RN. Transferred to post partum via wheelchair with infant in arms. Baby bands checked with Daksha KHAN RN.

## 2018-02-22 NOTE — PLAN OF CARE
Problem: Patient Care Overview  Goal: Plan of Care/Patient Progress Review  Outcome: Therapy, progress toward functional goals as expected  Pt called writer and Dr. Ingram into room stating she felt like it was time to push @ 0629. Began pushing @ 0630. Pushed effectively for 20 minutes.  baby girl @ 0650. Baby placed on mom's chest, positive bonding behaviors exhibited. Bldg minimal, fundus firm, midline. Intact perineum. 2 fundal checks complete. Plan to continue with standard PP cares. Contact MD with questions/concerns.

## 2018-02-22 NOTE — PROGRESS NOTES
Comfortable.  Notes suprapubic cramping.  Leaking reddish brown fluid.    /69  Temp 98.4  F (36.9  C) (Oral)  Resp 16  LMP 05/20/2017      EFM Category I  Irregular contractions    Cervix 3 cm 50% -3 station  Bloody show cannot r/o SROM    [place] catheter deflated and removed.   Will start IV pitocin     Carmen Ingram MD

## 2018-02-23 VITALS
DIASTOLIC BLOOD PRESSURE: 83 MMHG | TEMPERATURE: 98.5 F | RESPIRATION RATE: 17 BRPM | OXYGEN SATURATION: 100 % | SYSTOLIC BLOOD PRESSURE: 118 MMHG

## 2018-02-23 LAB — HGB BLD-MCNC: 10.9 G/DL (ref 11.7–15.7)

## 2018-02-23 PROCEDURE — 85018 HEMOGLOBIN: CPT | Performed by: OBSTETRICS & GYNECOLOGY

## 2018-02-23 PROCEDURE — 36415 COLL VENOUS BLD VENIPUNCTURE: CPT | Performed by: OBSTETRICS & GYNECOLOGY

## 2018-02-23 PROCEDURE — 25000132 ZZH RX MED GY IP 250 OP 250 PS 637: Performed by: OBSTETRICS & GYNECOLOGY

## 2018-02-23 RX ADMIN — IBUPROFEN 800 MG: 800 TABLET ORAL at 09:05

## 2018-02-23 NOTE — DISCHARGE SUMMARY
VAGINAL DELIVERY DISCHARGE SUMMARY    Admit date: 2018  Discharge date: 18    Admit Dx:   -  at 40w4d   - Elective induction of labor  - History of prior LEEP    Discharge Dx:  -  s/p delivery as noted below  - Elective induction of labor  - History of prior LEEP  - asymptomatic acute anemia of blood loss    Procedures:  - Spontaneous vaginal delivery  - Epidural analgesia    Admit HPI:  Marta Asencio is a 32 year old  at 40w4d by 9w4d US who was admitted for an elective IOL on 2018. Pregnancy was notable for history of a LEEP. Upon admission her cervix was 1.5/60/-3. She received misoprostol, a cook catheter and pitocin for augmentation. She received an epidural with good pain relief. AROM was performed at 0130 with clear fluid. She was complete at 0508 and began pushing at 0630. At 0650 a vigorous female infant was delivered in the ANDREINA position with apgars of 8 and 9. Weight 3941g. Delayed cord clamping was done for >60 seconds. The placenta delivered spontaneously, intact with a 3 vessel cord at 0656. Pitocin was given after delivery of the baby. No lacerations were present. QBL of 200mL. Sponge and sharp counts were correct. Dr. Ingram was present for delivery. Please see her admit H&P for full details of her PMH, PSH, Meds, Allergies and exam on admit. Please see her Delivery Summary for full details regarding her delivery.    Her postpartum course was uncomplicated. On PPD#1, she was meeting all of her postpartum goals and deemed stable for discharge. She was voiding without difficulty, tolerating a regular diet without nausea and vomiting, her pain was well controlled on oral pain medicines and her lochia was appropriate. Her hemoglobin prior to delivery was 11.1 and after delivery was 10.9. Her Rh status was positive, and Rhogam was not indicated. She is bottlefeeding her infant and desires a Mirena IUD for contraception.     Discharge Medications:     Review of your  medicines      START taking       Dose / Directions    ibuprofen 600 MG tablet   Commonly known as:  ADVIL/MOTRIN        Dose:  600 mg   Take 1 tablet (600 mg) by mouth every 6 hours as needed for other (cramping)   Quantity:  60 tablet   Refills:  0         CONTINUE these medicines which may have CHANGED, or have new prescriptions. If we are uncertain of the size of tablets/capsules you have at home, strength may be listed as something that might have changed.       Dose / Directions    acetaminophen 325 MG tablet   Commonly known as:  TYLENOL   This may have changed:    - medication strength  - how much to take  - when to take this        Dose:  650 mg   Take 2 tablets (650 mg) by mouth every 4 hours as needed for mild pain   Quantity:  60 tablet   Refills:  0         CONTINUE these medicines which have NOT CHANGED       Dose / Directions    cyclobenzaprine 5 MG tablet   Commonly known as:  FLEXERIL   Used for:  Back muscle spasm        Dose:  5 mg   Take 1 tablet (5 mg) by mouth 3 times daily as needed for muscle spasms   Quantity:  21 tablet   Refills:  0       doxylamine 25 MG Tabs tablet   Commonly known as:  UNISOM   Used for:  Supervision of normal pregnancy, Nausea and vomiting during pregnancy        Dose:  12.5 mg   Take 0.5 tablets (12.5 mg) by mouth 3 times daily Take with vitamin B6 for nausea/vomiting   Quantity:  28 each   Refills:  1       PRENATAL 19 Chew        CHEW AND SWALLOW 1 T PO D   Refills:  3       pyridOXINE 25 MG tablet   Commonly known as:  vitamin B-6   Used for:  Supervision of normal pregnancy, Nausea and vomiting during pregnancy        Dose:  25 mg   Take 1 tablet (25 mg) by mouth 3 times daily Take with unisom for nausea/vomiting   Quantity:  100 tablet   Refills:  1            Where to get your medicines      These medications were sent to [facility] [place], [place] - [address]  [address] [address]     Phone:  [phone]       acetaminophen 325 MG tablet     ibuprofen 600 MG tablet           Discharge/Disposition:  Marta Asencio was discharged to home in stable condition with the following instructions/medications:  1) Call for temperature > 100.4, bright red vaginal bleeding >1 pad an hour x 2 hours, foul smelling vaginal discharge, pain not controlled by usual oral pain meds, persistent nausea and vomiting not controlled on medications  2) She desired an IUD for contraception.  3) For feeding she decided to bottlefeed.  4) She was instructed to follow-up with her primary OB in 6 weeks for a routine postpartum visit.    # Discharge Pain Plan:  - During her hospitalization, Marta experienced pain due to a vaginal delivery.  The pain plan for discharge was discussed with Marta and the plan was created in a collaborative fashion.    - Pharmacologic adjuvants:  NSAIDs and Acetaminophen    Andreina Wilkerson MD  Ob/Gyn, PGY2

## 2018-02-23 NOTE — PROGRESS NOTES
[facility] Postpartum Rounding Note    S: Doing well this AM. Slept ok overnight. Ambulating without dizziness, eating and drinking without nausea. Lochia less than a typical period. Pain well controlled on oral medications. Bottlefeeding. Wants to go home today.     O:  Vitals:    18 0930 18 1600 18 2202 18 0427   BP: 134/77 133/84 116/70 118/76   Resp:  16 16   Temp: 98.6  F (37  C) 98  F (36.7  C) 97.9  F (36.6  C) 98.3  F (36.8  C)   TempSrc: Oral Oral Oral Oral   SpO2: 100%        Gen: Resting in bed, NAD  CV: RRR, extremities warm and well perfused  Lungs: Breathing comfortably on room air  Abd: Soft, nondistended, FF at umbilicus  Ext: non-tender, no edema, no erythema    A: 32 year old  PPD#1 s/p     P:   1. Routine post-partum care.   2. Heme: 12.8> QBL 200cc>AM Hgb pending  3. Rh positive, no Rhogam indicated. Rubella immune  4. Pain well controlled on ibuprofen and tylenol.   5. Infant: Doing well, bottlefeeding  6. Birth control: Mirena IUD     # Pain Assessment:   Current Pain Score 2018   Patient currently in pain? yes yes denies   Pain score (0-10) - - -   Pain location Abdomen - -   Pain descriptors Cramping - -   - Marta is experiencing pain due to a vaginal delivery. Pain management was discussed and the plan was created in a collaborative fashion.  Marta's response to the current recommendations: compliant  - Pharmacologic adjuvants: NSAIDs and Acetaminophen    Anticipate discharge home today   Andreina Wilkerson MD  Obstetrics and Gynecology, PGY-2      Patient seen and examined by me, agree with above resident note. Doing well and desires d/c home.  Medically ready for d/c.  Instructions given.  RTC 6 weeks    ADRIEL FLETCHER MD

## 2018-02-23 NOTE — PLAN OF CARE
Problem: Patient Care Overview  Goal: Plan of Care/Patient Progress Review  Outcome: Adequate for Discharge Date Met: 02/23/18  Patient's postpartum assessment WDL, vital signs stable. Fundus firm and midline, lochia WDL. Bonding well with infant. Formula feeding infant every 3-4 independently (per parental choice). Taking ibuprofen PRN for pain control, adequate per patient. Discharging today.

## 2018-02-23 NOTE — PLAN OF CARE
Problem: Patient Care Overview  Goal: Plan of Care/Patient Progress Review  Outcome: Improving  VSS. Pain well controlled with PRN Ibuprofen. Pt denied need for analgesics overnight. Pt bottle feeding infant independently. Asking appropriate questions about infant care. Postpartum checks WNL. Bonding well with baby.

## 2018-02-23 NOTE — PLAN OF CARE
Problem: Patient Care Overview  Goal: Plan of Care/Patient Progress Review  Patient's vitals are stable. Pt. Voids without issue. Patient is using ibuprofen for pain management and pain is adequately managed. Responds to infant's cues and is bonding with baby. [place] was done and turned in, ROP was done and just needs to be notarized. BD completed and handed in. Had BM today.

## 2018-04-20 ENCOUNTER — PRENATAL OFFICE VISIT (OUTPATIENT)
Dept: OBGYN | Facility: CLINIC | Age: 33
End: 2018-04-20
Payer: COMMERCIAL

## 2018-04-20 VITALS
SYSTOLIC BLOOD PRESSURE: 133 MMHG | HEART RATE: 70 BPM | DIASTOLIC BLOOD PRESSURE: 87 MMHG | WEIGHT: 199 LBS | BODY MASS INDEX: 29.82 KG/M2

## 2018-04-20 DIAGNOSIS — Z30.430 ENCOUNTER FOR IUD INSERTION: Primary | ICD-10-CM

## 2018-04-20 PROBLEM — Z34.81 ENCOUNTER FOR SUPERVISION OF OTHER NORMAL PREGNANCY IN FIRST TRIMESTER: Status: RESOLVED | Noted: 2017-08-08 | Resolved: 2018-04-20

## 2018-04-20 LAB — BETA HCG QUAL IFA URINE: NEGATIVE

## 2018-04-20 PROCEDURE — 58300 INSERT INTRAUTERINE DEVICE: CPT | Performed by: OBSTETRICS & GYNECOLOGY

## 2018-04-20 PROCEDURE — 99207 ZZC POST PARTUM EXAM: CPT | Performed by: OBSTETRICS & GYNECOLOGY

## 2018-04-20 PROCEDURE — 84703 CHORIONIC GONADOTROPIN ASSAY: CPT | Performed by: OBSTETRICS & GYNECOLOGY

## 2018-04-20 NOTE — NURSING NOTE
"Chief Complaint   Patient presents with     IUD       Initial /87  Pulse 70  Wt 199 lb (90.3 kg)  LMP 2017  BMI 29.82 kg/m2 Estimated body mass index is 29.82 kg/(m^2) as calculated from the following:    Height as of 18: 5' 8.5\" (1.74 m).    Weight as of this encounter: 199 lb (90.3 kg).  BP completed using cuff size: regular        The following HM Due: NONE      The following patient reported/Care Every where data was sent to:  P ABSTRACT QUALITY INITIATIVES [[id number]]        N/a      Marcelle Trujillo MA       Lot # [id number] Lot# [id number] exp# 2020       "

## 2018-04-20 NOTE — NURSING NOTE
"Chief Complaint   Patient presents with     Post Partum Exam     IUD     Lot # [id number] Lot# [id number] exp# 2020       Initial /87  Pulse 70  Wt 199 lb (90.3 kg)  LMP 2017  BMI 29.82 kg/m2 Estimated body mass index is 29.82 kg/(m^2) as calculated from the following:    Height as of 18: 5' 8.5\" (1.74 m).    Weight as of this encounter: 199 lb (90.3 kg).  BP completed using cuff size: regular        The following HM Due: NONE      The following patient reported/Care Every where data was sent to:  P ABSTRACT QUALITY INITIATIVES [[id number]]        N/a      Marcelle Trujillo MA              "

## 2018-04-20 NOTE — PROGRESS NOTES
Marta is here for a 8-week postpartum checkup and IUD insertion for contraception.    She had a  of a viable girl, at term with no complications. Date of delivery was 2018. Since delivery, she has been breast feeding.  She has no signs of infection, bleeding or other complications.  She is not pregnant.  We discussed contraceptions and she has chosen Mirena IUD.        History of Gestational Diabetes? No  Type of Delivery:  Vaginal  Feeding Method:  Breast  If initiated breast feeding and stopped, how long did you breast feed?:  n/a    REVIEW OF SYSTEMS:  Ears/Nose/Throat: negative  Respiratory: negative  Cardiovascular: negative  Gastrointestinal: negative  Genitourinary: negative  Musculoskeletal: negative    Neurologic: negative  Mood:  Her father  recently and her mood sometimes is depressed but she has no thoughts of harming herself or baby.  Her mother is not able to help her as much as she would like  Skin: negative   Endocrine:  negative  Bleeding has stopped.  She thinks that she may have had a period recently.     Contraception Plan: We discussed options and she would like IUD         EXAM:  /87  Pulse 70  Wt 199 lb (90.3 kg)  LMP 2017  BMI 29.82 kg/m2  HEENT: grossly normal.  NECK: no lymphadenopathy or thyroidomegaly.  LUNGS: CTA X 2, no rales or crackles.  BACK: No spinal or CVA tenderness.  HEART: RRR without murmurs clicks or gallops.  ABDOMEN: soft, non tender, good bowel sounds, without masses rebound, guarding or tenderness.  PELVIC:  External genitalia; normal without lesion, repair well healed.   Vagina: normal mucosa and rugae, no discharge.  Cervix: multiparous, well healed, without lesion.  Uterus: non pregnant in size, anteflexed nontender   Adnexa: non tender, without masses.    EXTREMITIES:  warm to touch, good pulses, no ankle edema or calf tenderness.  NEUROLOGIC: grossly normal.    ASSESSMENT:   6-week postpartum exam after .    PLAN:    Contraception  methods discussed.  One-hour glucose tolerance test needed? No  Mirena IUD for contraception.    Prior to the procedure we discussed IUD insertion, potential risks including expulsion and perforation.  All the patient's questions were answered to her apparent satisfaction.    After informed consent, the patient was positioned  in dorsal lithotomy position.     Bimanual exam revealed size and position of uterine corpus.      Strict sterile technique observed. Sterile speculum was placed and cervix and upper vagina prepped with Betadine solution.      Anterior lip of cervix grasped with single toothed tenaculum. Cervix dilated with graduated dilator.  Uterus sounded to 7 cm.      Mirena IUD sterilly placed on field, loaded, and guide set to 7 cm.  IUD inserted into uterine fundus using 's 2 step technique, w/o difficulty.  Patient tolerated procedure well; minimal cramping was encountered.  EBL less than 5 cc. IUD guide removed and string trimmed.     Patient was given standard post-procedure precautions.  (check string, call if cramping persists or if unable to locate string).  She was discharged from clinic in satisfactory condition.

## 2018-04-20 NOTE — MR AVS SNAPSHOT
After Visit Summary   4/20/2018    Marta Asencio    MRN: [id number]           Patient Information     Date Of Birth          1985        Visit Information        Provider Department      4/20/2018 3:00 PM Carmen Ingram MD [facility]        Today's Diagnoses     Encounter for IUD insertion    -  1      Care Instructions    What Mirena Users May Expect    What to watch for right after Mirena is placed  Some women may experience uterine cramps, bleeding, and/or dizziness during and right after Mirena is placed. To help minimize the cramps, you may taken ibuprofen 600 mg with food prior to your appointment. These symptoms should improve over the next 24 hours.  Mild cramping may be present for a few days after your placement  As a follow up, you should check your strings on 4 weeks or visit your clinic once in the first 4 to 12 weeks after Mirena is placed to make sure it is in the right position. After that, Mirena can be checked once a year as part of your routine exam.    Please use a back-up method (abstinence or condoms) for 5 days after placement.    Your periods may change  For the first 3 to 6 months, your monthly period may become irregular. You may also have frequent spotting or light bleeding. A few women have heavy bleeding during this time. After your body adjusts, the number of bleeding days is likely to decrease (but may remain irregular), and you may even find that your periods stop altogether for as long as Mirena is in place. Around the end of the third month of use, you may see up to a 75% reduction in the amount of menstrual bleeding. By one year, about 1 out of 5 users may hay have no period at all. At the end of two years, 70% have little or no bleeding. Your periods will return rapidly once Mirena is removed.     Mirena Strings  You may check your own Mirena strings by inserting a finger into the vagina and feeling the strings as they exit the cervix.   "The strings will initially feel firm, like fishing line, but will soften over a few weeks.  After the strings have softened, you or your partner should not be able to feel the strings during intercourse.  If you can feel the IUD, see your healthcare provider to have the position confirmed.  You may use tampons with Mirena in place.    Mirena does not protect against HIV or STDs.  Mirena does not prevent the formation of ovarian cysts.  Mirena does not typically reduce acne or cause weight gain or mood changes.    Please call [facility] at [phone] if you have questions or concerns.    For more information:  [email or web address]            Follow-ups after your visit        Who to contact     If you have questions or need follow up information about today's clinic visit or your schedule please contact [facility] directly at [phone].  Normal or non-critical lab and imaging results will be communicated to you by MyChart, letter or phone within 4 business days after the clinic has received the results. If you do not hear from us within 7 days, please contact the clinic through MyChart or phone. If you have a critical or abnormal lab result, we will notify you by phone as soon as possible.  Submit refill requests through IFTTT or call your pharmacy and they will forward the refill request to us. Please allow 3 business days for your refill to be completed.          Additional Information About Your Visit        IFTTT Information     IFTTT lets you send messages to your doctor, view your test results, renew your prescriptions, schedule appointments and more. To sign up, go to [email or web address] . Click on \"Log in\" on the left side of the screen, which will take you to the Welcome page. Then click on \"Sign up Now\" on the right side of the page.     You will be asked to enter the access code listed below, as well as some personal information. Please follow the " directions to create your username and password.     Your access code is: [email or web address]  Expires: 2018 10:30 AM     Your access code will  in 90 days. If you need help or a new code, please call your [facility] clinic or [phone].        Care EveryWhere ID     This is your Care EveryWhere ID. This could be used by other organizations to access your [facility] medical records  [id number]        Your Vitals Were     Last Period                   2017            Blood Pressure from Last 3 Encounters:   18 118/83   18 98/58   18 113/74    Weight from Last 3 Encounters:   18 221 lb 3.2 oz (100.3 kg)   18 218 lb (98.9 kg)   18 217 lb (98.4 kg)              We Performed the Following     Beta HCG qual IFA urine        Primary Care Provider Office Phone # Fax #    Sonal Hamzah Esqueda [phone]       [facility] [address] [place] [id number]        Equal Access to Services     [facility]: Hadii aad ku hadasho Soomaali, waaxda luqadaha, qaybta kaalmada adesiddharth, loc orona . So [facility] [phone].    ATENCIÓN: Si habla español, tiene a herman disposición servicios gratuitos de asistencia lingüística. Antonio al [phone].    We comply with applicable federal civil rights laws and [place] laws. We do not discriminate on the basis of race, color, national origin, age, disability, sex, sexual orientation, or gender identity.            Thank you!     Thank you for choosing [facility]  for your care. Our goal is always to provide you with excellent care. Hearing back from our patients is one way we can continue to improve our services. Please take a few minutes to complete the written survey that you may receive in the mail after your visit with us. Thank you!             Your Updated Medication List - Protect others around you: Learn how to safely use, store and throw away your medicines at  www.disposemymeds.org.          This list is accurate as of 18  3:05 PM.  Always use your most recent med list.                   Brand Name Dispense Instructions for use Diagnosis    acetaminophen 325 MG tablet    TYLENOL    60 tablet    Take 2 tablets (650 mg) by mouth every 4 hours as needed for mild pain     (normal spontaneous vaginal delivery)       cyclobenzaprine 5 MG tablet    FLEXERIL    21 tablet    Take 1 tablet (5 mg) by mouth 3 times daily as needed for muscle spasms    Back muscle spasm       doxylamine 25 MG Tabs tablet    UNISOM    28 each    Take 0.5 tablets (12.5 mg) by mouth 3 times daily Take with vitamin B6 for nausea/vomiting    Supervision of normal pregnancy, Nausea and vomiting during pregnancy       ibuprofen 600 MG tablet    ADVIL/MOTRIN    60 tablet    Take 1 tablet (600 mg) by mouth every 6 hours as needed for other (cramping)     (normal spontaneous vaginal delivery)       PRENATAL 19 Chew      CHEW AND SWALLOW 1 T PO D        pyridOXINE 25 MG tablet    vitamin B-6    100 tablet    Take 1 tablet (25 mg) by mouth 3 times daily Take with unisom for nausea/vomiting    Supervision of normal pregnancy, Nausea and vomiting during pregnancy

## 2018-04-20 NOTE — PATIENT INSTRUCTIONS

## 2018-04-21 ASSESSMENT — PATIENT HEALTH QUESTIONNAIRE - PHQ9: SUM OF ALL RESPONSES TO PHQ QUESTIONS 1-9: 3

## 2018-06-20 ENCOUNTER — OFFICE VISIT (OUTPATIENT)
Dept: FAMILY MEDICINE | Facility: CLINIC | Age: 33
End: 2018-06-20
Payer: COMMERCIAL

## 2018-06-20 VITALS
OXYGEN SATURATION: 100 % | TEMPERATURE: 98.6 F | HEART RATE: 79 BPM | DIASTOLIC BLOOD PRESSURE: 80 MMHG | WEIGHT: 192.1 LBS | SYSTOLIC BLOOD PRESSURE: 123 MMHG | BODY MASS INDEX: 28.78 KG/M2

## 2018-06-20 PROCEDURE — 99213 OFFICE O/P EST LOW 20 MIN: CPT | Performed by: NURSE PRACTITIONER

## 2018-06-20 RX ORDER — CITALOPRAM HYDROBROMIDE 20 MG/1
20 TABLET ORAL DAILY
Qty: 90 TABLET | Refills: 1 | Status: SHIPPED | OUTPATIENT
Start: 2018-06-20 | End: 2019-02-11

## 2018-06-20 ASSESSMENT — ANXIETY QUESTIONNAIRES
1. FEELING NERVOUS, ANXIOUS, OR ON EDGE: MORE THAN HALF THE DAYS
5. BEING SO RESTLESS THAT IT IS HARD TO SIT STILL: NOT AT ALL
3. WORRYING TOO MUCH ABOUT DIFFERENT THINGS: MORE THAN HALF THE DAYS
GAD7 TOTAL SCORE: 8
6. BECOMING EASILY ANNOYED OR IRRITABLE: SEVERAL DAYS
2. NOT BEING ABLE TO STOP OR CONTROL WORRYING: SEVERAL DAYS
7. FEELING AFRAID AS IF SOMETHING AWFUL MIGHT HAPPEN: SEVERAL DAYS

## 2018-06-20 ASSESSMENT — PATIENT HEALTH QUESTIONNAIRE - PHQ9: 5. POOR APPETITE OR OVEREATING: SEVERAL DAYS

## 2018-06-20 NOTE — MR AVS SNAPSHOT
After Visit Summary   6/20/2018    Marta Asencio    MRN: [id number]           Patient Information     Date Of Birth          1985        Visit Information        Provider Department      6/20/2018 3:15 PM Renee Rogers APRN CNP [facility]        Today's Diagnoses     Postpartum depression    -  1       Follow-ups after your visit        Additional Services     MENTAL HEALTH REFERRAL  - Adult; Outpatient Treatment; Individual/Couples/Family/Group Therapy/Health Psychology; [facility]: [facility] [phone]; We will contact you to schedule the appointment or please call with any questions       All scheduling is subject to the client's specific insurance plan & benefits, provider/location availability, and provider clinical specialities.  Please arrive 15 minutes early for your first appointment and bring your completed paperwork.    Please be aware that coverage of these services is subject to the terms and limitations of your health insurance plan.  Call member services at your health plan with any benefit or coverage questions.                            Who to contact     If you have questions or need follow up information about today's clinic visit or your schedule please contact [facility] directly at [phone].  Normal or non-critical lab and imaging results will be communicated to you by Veracytehart, letter or phone within 4 business days after the clinic has received the results. If you do not hear from us within 7 days, please contact the clinic through Veracytehart or phone. If you have a critical or abnormal lab result, we will notify you by phone as soon as possible.  Submit refill requests through MassHousing or call your pharmacy and they will forward the refill request to us. Please allow 3 business days for your refill to be completed.          Additional Information About Your Visit        Veracyteharb-datum Information     MassHousing lets you send  "messages to your doctor, view your test results, renew your prescriptions, schedule appointments and more. To sign up, go to [email or web address] . Click on \"Log in\" on the left side of the screen, which will take you to the Welcome page. Then click on \"Sign up Now\" on the right side of the page.     You will be asked to enter the access code listed below, as well as some personal information. Please follow the directions to create your username and password.     Your access code is: [email or web address]  Expires: 2018  3:31 PM     Your access code will  in 90 days. If you need help or a new code, please call your [facility] clinic or [phone].        Care EveryWhere ID     This is your Care EveryWhere ID. This could be used by other organizations to access your [facility] medical records  [id number]        Your Vitals Were     Pulse Temperature Pulse Oximetry BMI (Body Mass Index)          79 98.6  F (37  C) (Oral) 100% 28.78 kg/m2         Blood Pressure from Last 3 Encounters:   18 123/80   18 133/87   18 118/83    Weight from Last 3 Encounters:   18 192 lb 1.6 oz (87.1 kg)   18 199 lb (90.3 kg)   18 221 lb 3.2 oz (100.3 kg)              We Performed the Following     MENTAL HEALTH REFERRAL  - Adult; Outpatient Treatment; Individual/Couples/Family/Group Therapy/Health Psychology; [facility]: [facility] [phone]; We will contact you to schedule the appointment or please call with any questions          Today's Medication Changes          These changes are accurate as of 18  3:31 PM.  If you have any questions, ask your nurse or doctor.               Start taking these medicines.        Dose/Directions    citalopram 20 MG tablet   Commonly known as:  celeXA   Used for:  Postpartum depression        Dose:  20 mg   Take 1 tablet (20 mg) by mouth daily   Quantity:  90 tablet   Refills:  1            Where to get your medicines      These medications were " sent to [facility] - [place], [place] - [address] AT [facility] [address]  [address], [facility] [address]     Phone:  [phone]     citalopram 20 MG tablet                Primary Care Provider Office Phone # Fax #    Sonal Hamzah Esqueda [phone]       [address] [address]        Equal Access to Services     BECCA SHEIKH : Hadii aad ku hadasho Soomaali, waaxda luqadaha, qaybta kaalmada adeegyada, waxay idiin hayaan adeeg kharanurys orona . So [facility] [phone].    ATENCIÓN: Si habla español, tiene a herman disposición servicios gratuitos de asistencia lingüística. [facility] [phone].    We comply with applicable federal civil rights laws and [place] laws. We do not discriminate on the basis of race, color, national origin, age, disability, sex, sexual orientation, or gender identity.            Thank you!     Thank you for choosing [facility]  for your care. Our goal is always to provide you with excellent care. Hearing back from our patients is one way we can continue to improve our services. Please take a few minutes to complete the written survey that you may receive in the mail after your visit with us. Thank you!             Your Updated Medication List - Protect others around you: Learn how to safely use, store and throw away your medicines at www.disposemymeds.org.          This list is accurate as of 18  3:31 PM.  Always use your most recent med list.                   Brand Name Dispense Instructions for use Diagnosis    acetaminophen 325 MG tablet    TYLENOL    60 tablet    Take 2 tablets (650 mg) by mouth every 4 hours as needed for mild pain     (normal spontaneous vaginal delivery)       citalopram 20 MG tablet    celeXA    90 tablet    Take 1 tablet (20 mg) by mouth daily    Postpartum depression       cyclobenzaprine 5 MG tablet    FLEXERIL    21 tablet    Take 1  tablet (5 mg) by mouth 3 times daily as needed for muscle spasms    Back muscle spasm       doxylamine 25 MG Tabs tablet    UNISOM    28 each    Take 0.5 tablets (12.5 mg) by mouth 3 times daily Take with vitamin B6 for nausea/vomiting    Supervision of normal pregnancy, Nausea and vomiting during pregnancy       ibuprofen 600 MG tablet    ADVIL/MOTRIN    60 tablet    Take 1 tablet (600 mg) by mouth every 6 hours as needed for other (cramping)     (normal spontaneous vaginal delivery)       PRENATAL 19 Chew      CHEW AND SWALLOW 1 T PO D        pyridOXINE 25 MG tablet    vitamin B-6    100 tablet    Take 1 tablet (25 mg) by mouth 3 times daily Take with unisom for nausea/vomiting    Supervision of normal pregnancy, Nausea and vomiting during pregnancy

## 2018-06-20 NOTE — LETTER
June 20, 2018      Marta Asencio  [address]  [facility] [address]        To Whom It May Concern:    Marta Asencio  was seen on 6/20/2018. She is suffering from a medical condition that caused her to miss work on 6/18/2018, 6/19/2018, and 6/20/2018. She will need to miss work on 6/21/2018 and 6/22/2018 due to medical reasons and can return to work without restriction on 6/25/2018.        Sincerely,        JESSENIA Quach CNP

## 2018-06-20 NOTE — PROGRESS NOTES
SUBJECTIVE:   Marta Asencio is a 32 year old female who presents to clinic today for the following health issues:    Abnormal Mood Symptoms  Onset: Throughout pregnancy- gave birth in February     Description:   Depression: YES  Anxiety: YES    Accompanying Signs & Symptoms:  Still participating in activities that you used to enjoy: No interest or energy to do anything outdoors   Fatigue: YES  Irritability: YES  Difficulty concentrating: YES  Changes in appetite: YES  Problems with sleep: YES  Heart racing/beating fast : no  Thoughts of hurting yourself or others: none    History:   Recent stress: YES- financial struggles, issues in relationship (no physical abuse), multiple deaths in family   Prior depression hospitalization: None  Family history of depression: YES  Family history of anxiety: YES    Precipitating factors:   Alcohol/drug use: no     Alleviating factors:  Talking with someone     Therapies Tried and outcome: Never used medications, was seeing a therapist for 6 years     -------------------------------------    Problem list and histories reviewed & adjusted, as indicated.  Additional history: as documented    Patient Active Problem List   Diagnosis     Need for Tdap vaccination      (normal spontaneous vaginal delivery)     Encounter for IUD insertion     Past Surgical History:   Procedure Laterality Date     LEEP TX, CERVICAL N/A 2005    FREIDA 3 of cervix        Social History   Substance Use Topics     Smoking status: Former Smoker     Packs/day: 0.50     Smokeless tobacco: Never Used      Comment: 6-7 per day     Alcohol use No     Family History   Problem Relation Age of Onset     Alcohol/Drug Mother      Depression Mother      GERD Mother      Mental Illness Mother      GASTROINTESTINAL DISEASE Father      hepatitis     Depression Father      Diabetes Father      Colon Cancer Father      Tuberculosis Father      Hyperlipidemia Father      Mental Illness Father      Respiratory  Daughter      POSSIBLE ASTHSMA     Cerebrovascular Disease Maternal Grandfather            Reviewed and updated as needed this visit by clinical staff       Reviewed and updated as needed this visit by Provider         ROS:  Constitutional, HEENT, cardiovascular, pulmonary, gi and gu systems are negative, except as otherwise noted.    OBJECTIVE:     /80 (BP Location: Left arm, Patient Position: Sitting, Cuff Size: Adult Regular)  Pulse 79  Temp 98.6  F (37  C) (Oral)  Wt 192 lb 1.6 oz (87.1 kg)  SpO2 100%  BMI 28.78 kg/m2  Body mass index is 28.78 kg/(m^2).   GENERAL: healthy, alert and no distress  NECK: no adenopathy, no asymmetry, masses, or scars and thyroid normal to palpation  RESP: lungs clear to auscultation - no rales, rhonchi or wheezes  CV: regular rate and rhythm, normal S1 S2, no S3 or S4, no murmur, click or rub, no peripheral edema and peripheral pulses strong  PSYCH: mentation appears normal, affect normal/bright and anxious    Diagnostic Test Results:  No results found for this or any previous visit (from the past 24 hour(s)).    ASSESSMENT/PLAN:     Problem List Items Addressed This Visit     None      Visit Diagnoses     Postpartum depression    -  Primary    Relevant Medications    citalopram (CELEXA) 20 MG tablet    Other Relevant Orders    MENTAL HEALTH REFERRAL  - Adult; Outpatient Treatment; Individual/Couples/Family/Group Therapy/Health Psychology; [facility]: [facility] [phone]; We will contact you to schedule the appointment or please call with any questions         Note given for missing work this week; follow up with me in 3-4 weeks.  JESSENIA Quach CNP  [facility]

## 2018-06-21 ASSESSMENT — PATIENT HEALTH QUESTIONNAIRE - PHQ9: SUM OF ALL RESPONSES TO PHQ QUESTIONS 1-9: 9

## 2018-06-21 ASSESSMENT — ANXIETY QUESTIONNAIRES: GAD7 TOTAL SCORE: 8

## 2019-02-11 NOTE — TELEPHONE ENCOUNTER
"Requested Prescriptions   Pending Prescriptions Disp Refills     citalopram (CELEXA) 20 MG tablet [Pharmacy Med Name: CITALOPRAM 20MG TABLETS] 90 tablet 0    Last Written Prescription Date:  06/20/2018  Last Fill Quantity: 90,  # refills: 1   Last office visit: 6/20/2018 with prescribing provider:  07/16/2018   Future Office Visit:   Sig: TAKE 1 TABLET(20 MG) BY MOUTH DAILY    SSRIs Protocol Failed - 2/11/2019  3:37 PM       Failed - PHQ-9 score less than 5 in past 6 months    Please review last PHQ-9 score.          Failed - Recent (6 mo) or future (30 days) visit within the authorizing provider's specialty    Patient had office visit in the last 6 months or has a visit in the next 30 days with authorizing provider or within the authorizing provider's specialty.  See \"Patient Info\" tab in inbasket, or \"Choose Columns\" in Meds & Orders section of the refill encounter.           Passed - Medication is active on med list       Passed - Patient is age 18 or older       Passed - No active pregnancy on record       Passed - No positive pregnancy test in last 12 months        "

## 2019-02-11 NOTE — TELEPHONE ENCOUNTER
Called patient, left voicemail to return call to clinic    Patient needs updated PHQ-9 and depression f/u    Anita Leung, RN  Triage Nurse

## 2019-02-12 RX ORDER — CITALOPRAM HYDROBROMIDE 20 MG/1
TABLET ORAL
Qty: 30 TABLET | Refills: 0 | Status: SHIPPED | OUTPATIENT
Start: 2019-02-12 | End: 2019-04-08

## 2019-02-12 ASSESSMENT — PATIENT HEALTH QUESTIONNAIRE - PHQ9: SUM OF ALL RESPONSES TO PHQ QUESTIONS 1-9: 0

## 2019-02-12 NOTE — TELEPHONE ENCOUNTER
Called patient, left voicemail to return call to clinic    Due for depression follow up and updated PHQ-9    Anita Leung, RN  Triage Nurse

## 2019-02-12 NOTE — TELEPHONE ENCOUNTER
Medication is being filled for 1 time refill only due to:  Patient needs to be seen because Depression follow up.    Patient returned call to clinic. Appointment scheduled 02/28/2019.  PHQ-9 updated: 0    Anita Leung RN  Triage Nurse

## 2019-03-08 NOTE — MR AVS SNAPSHOT
"              After Visit Summary   2018    Marta Asencio    MRN: [id number]           Patient Information     Date Of Birth          1985        Visit Information        Provider Department      2018 9:30 AM Lilia Madsen MD [facility]        Today's Diagnoses     Encounter for supervision of other normal pregnancy in 3rd  trimester    -  1       Follow-ups after your visit        Who to contact     If you have questions or need follow up information about today's clinic visit or your schedule please contact [facility] directly at [phone].  Normal or non-critical lab and imaging results will be communicated to you by Voradiushart, letter or phone within 4 business days after the clinic has received the results. If you do not hear from us within 7 days, please contact the clinic through Voradiushart or phone. If you have a critical or abnormal lab result, we will notify you by phone as soon as possible.  Submit refill requests through Bleachers or call your pharmacy and they will forward the refill request to us. Please allow 3 business days for your refill to be completed.          Additional Information About Your Visit        MyChart Information     Bleachers lets you send messages to your doctor, view your test results, renew your prescriptions, schedule appointments and more. To sign up, go to [email or web address] . Click on \"Log in\" on the left side of the screen, which will take you to the Welcome page. Then click on \"Sign up Now\" on the right side of the page.     You will be asked to enter the access code listed below, as well as some personal information. Please follow the directions to create your username and password.     Your access code is: [email or web address]  Expires: 2018  9:30 AM     Your access code will  in 90 days. If you need help or a new code, please call your [facility] or [phone].        Care EveryWhere ID     This is your " Care EveryWhere ID. This could be used by other organizations to access your [facility] medical records  [id number]        Your Vitals Were     Pulse Temperature Last Period BMI (Body Mass Index)          89 98.1  F (36.7  C) (Oral) 05/20/2017 32.66 kg/m2         Blood Pressure from Last 3 Encounters:   02/13/18 113/74   02/08/18 109/69   01/29/18 118/75    Weight from Last 3 Encounters:   02/13/18 218 lb (98.9 kg)   02/08/18 217 lb (98.4 kg)   01/29/18 216 lb (98 kg)              Today, you had the following     No orders found for display       Primary Care Provider Office Phone # Fax #    Carmen Ingram [phone]       [address] [facility] [address]  [facility] [address]        Equal Access to Services     [facility] Sanitarium[facility]ILLIAN : Hadii aad ku hadasho Sode, waaxda luqadaha, qaybta kaalmada adeangeliayada, loc orona . So [facility] [phone].    ATENCIÓN: Si habla español, tiene a herman disposición servicios gratuitos de asistencia lingüística. Antonio al [phone].    We comply with applicable federal civil rights laws and [place] laws. We do not discriminate on the basis of race, color, national origin, age, disability, sex, sexual orientation, or gender identity.            Thank you!     Thank you for choosing [facility]  for your care. Our goal is always to provide you with excellent care. Hearing back from our patients is one way we can continue to improve our services. Please take a few minutes to complete the written survey that you may receive in the mail after your visit with us. Thank you!             Your Updated Medication List - Protect others around you: Learn how to safely use, store and throw away your medicines at www.disposemymeds.org.          This list is accurate as of 2/13/18  9:42 AM.  Always use your most recent med list.                   Brand Name Dispense Instructions for use Diagnosis    acetaminophen 500 MG tablet    TYLENOL    50 tablet     Take 2 tablets (1,000 mg) by mouth every 6 hours as needed for mild pain    Upper back pain on left side, Back muscle spasm       cyclobenzaprine 5 MG tablet    FLEXERIL    21 tablet    Take 1 tablet (5 mg) by mouth 3 times daily as needed for muscle spasms    Back muscle spasm       doxylamine 25 MG Tabs tablet    UNISOM    28 each    Take 0.5 tablets (12.5 mg) by mouth 3 times daily Take with vitamin B6 for nausea/vomiting    Supervision of normal pregnancy, Nausea and vomiting during pregnancy       * nitroFURantoin (macrocrystal-monohydrate) 100 MG capsule    MACROBID    14 capsule    Take 1 capsule (100 mg) by mouth 2 times daily    Dysuria, Urinary frequency       * nitroFURantoin (macrocrystal-monohydrate) 100 MG capsule    MACROBID    14 capsule    Take 1 capsule (100 mg) by mouth 2 times daily    Dysuria, Urinary frequency       Prenatal Adult Gummy/DHA/FA 0.4-25 MG Chew     100 tablet    Take 1 chew tab by mouth daily    Supervision of normal pregnancy in second trimester       * prenatal multivitamin plus iron 27-0.8 MG Tabs per tablet      Take 1 tablet by mouth daily        * prenatal multivitamin plus iron 27-0.8 MG Tabs per tablet     100 tablet    Take 1 tablet by mouth daily    Supervision of normal pregnancy       * PRENATAL 19 Chew      CHEW AND SWALLOW 1 T PO D        pyridOXINE 25 MG tablet    vitamin B-6    100 tablet    Take 1 tablet (25 mg) by mouth 3 times daily Take with unisom for nausea/vomiting    Supervision of normal pregnancy, Nausea and vomiting during pregnancy       * Notice:  This list has 5 medication(s) that are the same as other medications prescribed for you. Read the directions carefully, and ask your doctor or other care provider to review them with you.       (4) no impairment

## 2019-04-05 NOTE — PROGRESS NOTES
SUBJECTIVE:                                                    Marta Asencio is a 33 year old female who presents to clinic today for the following health issues:      Depression Followup    Status since last visit: Stable to improved; baby now 14 months. Not yet sleeping through the night, still eating at night. Mom sleeping okay.    See PHQ-9 for current symptoms.  Other associated symptoms: None    Complicating factors:   Significant life event:  No   Current substance abuse:  None  Anxiety or Panic symptoms:  No    PHQ 2018   PHQ-9 Total Score 3 9 0   Q9: Thoughts of better off dead/self-harm past 2 weeks Not at all Not at all Not at all       PHQ-9  English  PHQ-9   Any Language  Suicide Assessment Five-step Evaluation and Treatment (SAFE-T)    Amount of exercise or physical activity: a little bit on a daily basis    Problems taking medications regularly: No    Medication side effects: none    Diet: regular (no restrictions)        Weight, trying to eat better and exercising and not being able to lose the weight    Problem list and histories reviewed & adjusted, as indicated.  Additional history: as documented    Patient Active Problem List   Diagnosis     Need for Tdap vaccination      (normal spontaneous vaginal delivery)     Encounter for IUD insertion     Postpartum depression     Past Surgical History:   Procedure Laterality Date     LEEP TX, CERVICAL N/A 2005    FREIDA 3 of cervix        Social History     Tobacco Use     Smoking status: Current Every Day Smoker     Packs/day: 0.50     Smokeless tobacco: Never Used     Tobacco comment: 6-7 per day   Substance Use Topics     Alcohol use: No     Family History   Problem Relation Age of Onset     Alcohol/Drug Mother      Depression Mother      GERD Mother      Mental Illness Mother      Gastrointestinal Disease Father         hepatitis     Depression Father      Diabetes Father      Colon Cancer Father       "Tuberculosis Father      Hyperlipidemia Father      Mental Illness Father      Respiratory Daughter         POSSIBLE ASTHSMA     Cerebrovascular Disease Maternal Grandfather            ROS:  Constitutional, HEENT, cardiovascular, pulmonary, GI, , musculoskeletal, neuro, skin, endocrine and psych systems are negative, except as otherwise noted.    OBJECTIVE:     /84   Pulse 77   Temp 98  F (36.7  C) (Temporal)   Resp 12   Ht 1.749 m (5' 8.86\")   Wt 87.8 kg (193 lb 8 oz)   LMP 04/08/2019   SpO2 100%   BMI 28.69 kg/m    Body mass index is 28.69 kg/m .   GENERAL: healthy, alert and no distress  RESP: lungs clear to auscultation - no rales, rhonchi or wheezes  CV: regular rate and rhythm, normal S1 S2, no S3 or S4, no murmur, click or rub, no peripheral edema and peripheral pulses strong  ABDOMEN: soft, nontender, no hepatosplenomegaly, no masses and bowel sounds normal  MS: no gross musculoskeletal defects noted, no edema  PSYCH: mentation appears normal, affect normal/bright    Diagnostic Test Results:  No results found for this or any previous visit (from the past 24 hour(s)).    ASSESSMENT/PLAN:     Problem List Items Addressed This Visit     Postpartum depression    Relevant Medications    citalopram (CELEXA) 20 MG tablet      Other Visit Diagnoses     Overweight (BMI 25.0-29.9)    -  Primary       symptoms stable- discussed strategies to improve baby's sleep and strategies for weight loss and better nutrition  JESSENIA Quach [facility]  Please note greater than 50% of this 30 minute appointment were spent in face to face counseling with the patient of the issues described above in the history of present illness and in the plan, including counseling      "

## 2019-04-08 ENCOUNTER — OFFICE VISIT (OUTPATIENT)
Dept: OBGYN | Facility: CLINIC | Age: 34
End: 2019-04-08
Payer: COMMERCIAL

## 2019-04-08 ENCOUNTER — RESULT FOLLOW UP (OUTPATIENT)
Dept: OBGYN | Facility: CLINIC | Age: 34
End: 2019-04-08

## 2019-04-08 ENCOUNTER — OFFICE VISIT (OUTPATIENT)
Dept: FAMILY MEDICINE | Facility: CLINIC | Age: 34
End: 2019-04-08
Payer: COMMERCIAL

## 2019-04-08 VITALS
HEART RATE: 77 BPM | SYSTOLIC BLOOD PRESSURE: 122 MMHG | DIASTOLIC BLOOD PRESSURE: 84 MMHG | BODY MASS INDEX: 28.62 KG/M2 | WEIGHT: 193 LBS

## 2019-04-08 VITALS
OXYGEN SATURATION: 100 % | DIASTOLIC BLOOD PRESSURE: 84 MMHG | HEIGHT: 69 IN | HEART RATE: 77 BPM | WEIGHT: 193.5 LBS | SYSTOLIC BLOOD PRESSURE: 122 MMHG | BODY MASS INDEX: 28.66 KG/M2 | RESPIRATION RATE: 12 BRPM | TEMPERATURE: 98 F

## 2019-04-08 DIAGNOSIS — Z01.419 WELL WOMAN EXAM WITH ROUTINE GYNECOLOGICAL EXAM: Primary | ICD-10-CM

## 2019-04-08 DIAGNOSIS — R87.810 CERVICAL HIGH RISK HPV (HUMAN PAPILLOMAVIRUS) TEST POSITIVE: Chronic | ICD-10-CM

## 2019-04-08 DIAGNOSIS — E66.3 OVERWEIGHT (BMI 25.0-29.9): Primary | ICD-10-CM

## 2019-04-08 LAB
CHOLEST SERPL-MCNC: 208 MG/DL
ERYTHROCYTE [DISTWIDTH] IN BLOOD BY AUTOMATED COUNT: 13.2 % (ref 10–15)
GLUCOSE SERPL-MCNC: 84 MG/DL (ref 70–99)
HCT VFR BLD AUTO: 39.9 % (ref 35–47)
HDLC SERPL-MCNC: 41 MG/DL
HGB BLD-MCNC: 13.1 G/DL (ref 11.7–15.7)
LDLC SERPL CALC-MCNC: 151 MG/DL
MCH RBC QN AUTO: 31.4 PG (ref 26.5–33)
MCHC RBC AUTO-ENTMCNC: 32.8 G/DL (ref 31.5–36.5)
MCV RBC AUTO: 96 FL (ref 78–100)
NONHDLC SERPL-MCNC: 167 MG/DL
PLATELET # BLD AUTO: 272 10E9/L (ref 150–450)
RBC # BLD AUTO: 4.17 10E12/L (ref 3.8–5.2)
TRIGL SERPL-MCNC: 79 MG/DL
WBC # BLD AUTO: 6.1 10E9/L (ref 4–11)

## 2019-04-08 PROCEDURE — 85027 COMPLETE CBC AUTOMATED: CPT | Performed by: OBSTETRICS & GYNECOLOGY

## 2019-04-08 PROCEDURE — 36415 COLL VENOUS BLD VENIPUNCTURE: CPT | Performed by: OBSTETRICS & GYNECOLOGY

## 2019-04-08 PROCEDURE — G0145 SCR C/V CYTO,THINLAYER,RESCR: HCPCS | Performed by: OBSTETRICS & GYNECOLOGY

## 2019-04-08 PROCEDURE — 82947 ASSAY GLUCOSE BLOOD QUANT: CPT | Performed by: OBSTETRICS & GYNECOLOGY

## 2019-04-08 PROCEDURE — 99395 PREV VISIT EST AGE 18-39: CPT | Performed by: OBSTETRICS & GYNECOLOGY

## 2019-04-08 PROCEDURE — 99214 OFFICE O/P EST MOD 30 MIN: CPT | Performed by: NURSE PRACTITIONER

## 2019-04-08 PROCEDURE — G0476 HPV COMBO ASSAY CA SCREEN: HCPCS | Performed by: OBSTETRICS & GYNECOLOGY

## 2019-04-08 PROCEDURE — 80061 LIPID PANEL: CPT | Performed by: OBSTETRICS & GYNECOLOGY

## 2019-04-08 RX ORDER — CITALOPRAM HYDROBROMIDE 20 MG/1
20 TABLET ORAL DAILY
Qty: 90 TABLET | Refills: 3 | Status: SHIPPED | OUTPATIENT
Start: 2019-04-08 | End: 2020-06-16

## 2019-04-08 ASSESSMENT — MIFFLIN-ST. JEOR: SCORE: 1634.83

## 2019-04-08 NOTE — PATIENT INSTRUCTIONS
-switch baby milk to water at night, and try earlier badtime  -intermittent fasting: eat between the hours of 12-8 pm, only water, black coffee, or other non-calorie liquids other hours of the day. This should help with weight management  -Follow up with me 3-6 months

## 2019-04-08 NOTE — LETTER
April 10, 2019      Marta Asencio  [address] [facility] [address]        Dear ,    We are writing to inform you of your test results.    Your results are normal, except your cholesterol is borderline high.  Advise increase exercise, careful diet.  No need for meds at this point.  Check in one year.     Resulted Orders   Lipid panel reflex to direct LDL Fasting   Result Value Ref Range    Cholesterol 208 (H) <200 mg/dL      Comment:      Desirable:       <200 mg/dl    Triglycerides 79 <150 mg/dL      Comment:      Fasting specimen    HDL Cholesterol 41 (L) >49 mg/dL    LDL Cholesterol Calculated 151 (H) <100 mg/dL      Comment:      Above desirable:  100-129 mg/dl  Borderline High:  130-159 mg/dL  High:             160-189 mg/dL  Very high:       >189 mg/dl      Non HDL Cholesterol 167 (H) <130 mg/dL      Comment:      Above Desirable:  130-159 mg/dl  Borderline high:  160-189 mg/dl  High:             190-219 mg/dl  Very high:       >219 mg/dl     Glucose   Result Value Ref Range    Glucose 84 70 - 99 mg/dL      Comment:      Fasting specimen   CBC with platelets   Result Value Ref Range    WBC 6.1 4.0 - 11.0 10e9/L    RBC Count 4.17 3.8 - 5.2 10e12/L    Hemoglobin 13.1 11.7 - 15.7 g/dL    Hematocrit 39.9 35.0 - 47.0 %    MCV 96 78 - 100 fl    MCH 31.4 26.5 - 33.0 pg    MCHC 32.8 31.5 - 36.5 g/dL    RDW 13.2 10.0 - 15.0 %    Platelet Count 272 150 - 450 10e9/L       If you have any questions or concerns, please call the clinic at the number listed above.       Sincerely,        Carmen Ingram MD

## 2019-04-08 NOTE — PROGRESS NOTES
Marta is a 33 year old  female who presents for annual exam.     Menses are regular q 28-30 days and normal lasting 6 days.  Menses flow: normal and medium.  Patient's last menstrual period was 2019.. Using IUD for contraception.  She is not currently considering pregnancy.  Besides routine health maintenance, she has no other health concerns today .  GYNECOLOGIC HISTORY:  Menarche: 11  Marta is  Not  History sexually transmitted infections:No STD history  STI testing offered?  Declined  MARLEY exposure: Unknown  History of abnormal Pap smear: YES - updated in Problem List and Health Maintenance accordingly  Family history of breast CA: No  Family history of uterine/ovarian CA: No    Family history of colon CA: No    HEALTH MAINTENANCE:  Cholesterol: (  Cholesterol   Date Value Ref Range Status   2005 185 0 - 200 mg/dL Final     Comment:     Cholesterol Reference Range:   <200  The NCEP recommends further         evaluation of:         1.  Patients with cholesterol             greater than 200 mg/dL             if additional risk factors             are present.         2.  All patients with a             cholesterol greater than             240 mg/dL.    History of abnormal lipids: No    Mammo: no . History of abnormal Mammo: Not applicable.  Regular Self Breast Exams: No    Current or Past (Physical, Sexual or Emotional):  No  Do you feel safe in your environment:  Yes    Calcium/Vitamin D intake: source:  dairy Adequate? Yes   Last TDAP? 2017 Offered today? No    TSH: (No results found for: TSH )  Pap; (  Lab Results   Component Value Date    PAP NIL 2008    PAP NIL 2007    PAP NIL 2006    )    HISTORY:  OB History    Para Term  AB Living   6 3 3 0 3 3   SAB TAB Ectopic Multiple Live Births   1 2 0 0 3      # Outcome Date GA Lbr Gumaro/2nd Weight Sex Delivery Anes PTL Lv   6 Term 18 40w4d 03:38 / 01:42  F Vag-Spont EPI N LASHONDA      Name: CAREY IQBAL       Apgar1: 8  Apgar5: 9   5 TAB            4 SAB            3 Term 07 39w0d  3.43 kg (7 lb 9 oz) F    LASHONDA      Name: Inocente Patrick Term 03 38w0d 18:00 2.977 kg (6 lb 9 oz) F    LASHONDA   1 TAB              Past Medical History:   Diagnosis Date     NO ACTIVE PROBLEMS      Past Surgical History:   Procedure Laterality Date     LEEP TX, CERVICAL N/A 2005    FREIDA 3 of cervix      Family History   Problem Relation Age of Onset     Alcohol/Drug Mother      Depression Mother      GERD Mother      Mental Illness Mother      Gastrointestinal Disease Father         hepatitis     Depression Father      Diabetes Father      Colon Cancer Father      Tuberculosis Father      Hyperlipidemia Father      Mental Illness Father      Respiratory Daughter         POSSIBLE ASTHSMA     Cerebrovascular Disease Maternal Grandfather      Social History     Socioeconomic History     Marital status: Single     Spouse name: None     Number of children: None     Years of education: None     Highest education level: None   Occupational History     None   Social Needs     Financial resource strain: None     Food insecurity:     Worry: None     Inability: None     Transportation needs:     Medical: None     Non-medical: None   Tobacco Use     Smoking status: Current Every Day Smoker     Packs/day: 0.50     Smokeless tobacco: Never Used     Tobacco comment: 6-7 per day   Substance and Sexual Activity     Alcohol use: No     Drug use: No     Sexual activity: Yes     Partners: Male     Birth control/protection: Pill, Condom   Lifestyle     Physical activity:     Days per week: None     Minutes per session: None     Stress: None   Relationships     Social connections:     Talks on phone: None     Gets together: None     Attends Taoism service: None     Active member of club or organization: None     Attends meetings of clubs or organizations: None     Relationship status: None     Intimate partner violence:     Fear of  current or ex partner: None     Emotionally abused: None     Physically abused: None     Forced sexual activity: None   Other Topics Concern      Service No     Blood Transfusions No     Caffeine Concern No     Occupational Exposure No     Hobby Hazards No     Sleep Concern No     Stress Concern No     Weight Concern No     Special Diet No     Back Care No     Exercise Yes     Bike Helmet No     Seat Belt Yes     Self-Exams No   Social History Narrative     None       Current Outpatient Medications:      acetaminophen (TYLENOL) 325 MG tablet, Take 2 tablets (650 mg) by mouth every 4 hours as needed for mild pain (Patient not taking: Reported on 4/8/2019), Disp: 60 tablet, Rfl: 0     citalopram (CELEXA) 20 MG tablet, Take 1 tablet (20 mg) by mouth daily, Disp: 90 tablet, Rfl: 3     cyclobenzaprine (FLEXERIL) 5 MG tablet, Take 1 tablet (5 mg) by mouth 3 times daily as needed for muscle spasms (Patient not taking: Reported on 6/20/2018), Disp: 21 tablet, Rfl: 0     doxylamine (UNISOM) 25 MG TABS tablet, Take 0.5 tablets (12.5 mg) by mouth 3 times daily Take with vitamin B6 for nausea/vomiting (Patient not taking: Reported on 8/10/2017), Disp: 28 each, Rfl: 1     ibuprofen (ADVIL/MOTRIN) 600 MG tablet, Take 1 tablet (600 mg) by mouth every 6 hours as needed for other (cramping), Disp: 60 tablet, Rfl: 0     Prenatal Vit-Fe Fumarate-FA (PRENATAL 19) CHEW, CHEW AND SWALLOW 1 T PO D, Disp: , Rfl: 3     pyridOXINE (VITAMIN B-6) 25 MG tablet, Take 1 tablet (25 mg) by mouth 3 times daily Take with unisom for nausea/vomiting (Patient not taking: Reported on 12/11/2017), Disp: 100 tablet, Rfl: 1     Allergies   Allergen Reactions     No Known Drug Allergies        Past medical, surgical, social and family history were reviewed and updated in EPIC.    ROS:   C:     NEGATIVE for fever, chills, change in weight  I:       NEGATIVE for worrisome rashes, moles or lesions  E:     NEGATIVE for vision changes or irritation  E/M:  NEGATIVE for ear, mouth and throat problems  R:     NEGATIVE for significant cough or SOB  CV:   NEGATIVE for chest pain, palpitations or peripheral edema  GI:     NEGATIVE for nausea, abdominal pain, heartburn, or change in bowel habits  :   NEGATIVE for frequency, dysuria, hematuria, vaginal discharge, or irregular bleeding  M:     NEGATIVE for significant arthralgias or myalgia  N:      NEGATIVE for weakness, dizziness or paresthesias  E:      NEGATIVE for temperature intolerance, skin/hair changes  P:      NEGATIVE for changes in mood or affect.    EXAM:  /84   Pulse 77   Wt 87.5 kg (193 lb)   LMP 04/08/2019   BMI 28.62 kg/m     BMI: Body mass index is 28.62 kg/m .  Constitutional: healthy, alert and no distress  Head: Normocephalic. No masses, lesions, tenderness or abnormalities  Neck: Neck supple. Trachea midline. No adenopathy. Thyroid symmetric, normal size.   Cardiovascular: RRR.   Respiratory: Negative.   Breast: No nodularity, asymmetry or nipple discharge bilaterally.  Gastrointestinal: Abdomen soft, non-tender, non-distended. No masses, organomegaly.  :  Vulva:  No external lesions, normal female hair distribution, no inguinal adenopathy.    Urethra:  Midline, non-tender, well supported, no discharge  Vagina:  Moist, pink, no abnormal discharge, no lesions  Cervix unremarkable; IUD strings visible   Uterus:  Normal size, anteflexed , non-tender, freely mobile  Ovaries:  No masses appreciated, non-tender, mobile  Rectal Exam: deferred  Musculoskeletal: extremities normal  Skin: no suspicious lesions or rashes  Psychiatric: Affect appropriate, cooperative,mentation appears normal.     COUNSELING:   Reviewed preventive health counseling, as reflected in patient instructions   reports that she has been smoking.  She has been smoking about 0.50 packs per day. She has never used smokeless tobacco.  Tobacco Cessation Action Plan: Information offered: Patient not interested at this time  Body  mass index is 28.62 kg/m .  Weight management plan: Discussed healthy diet and exercise guidelines  FRAX Risk Assessment    ASSESSMENT:  33 year old female with satisfactory annual exam  (Z01.419) Well woman exam with routine gynecological exam  (primary encounter diagnosis)  Comment:   Plan: Lipid panel reflex to direct LDL Fasting,         Glucose, CBC with platelets                Pap smear and HPV testing per ASCCP guidelines

## 2019-04-08 NOTE — LETTER
April 16, 2019      Marta Asencio  [address] [facility] [address]    Dear ,      This letter is in regards to the PAP smear and HPV (Human Papillomavirus) test you had done recently. Your PAP test result is normal, but your HPV (Human Papillomavirus) test was positive.     About 80 percent of women have been exposed to HPV virus throughout their lifetime. There is no medication for the treatment of HPV. Typically your own immune system gets rid of the virus before it does harm. HPV is spread by direct skin-to-skin contact, including sexual intercourse, oral sex, anal sex, or any other contact involving the genital area (example: hand to genital contact). It is not possible to become infected with HPV by touching an object, such as a toilet seat. Most people who are infected with HPV have no signs or symptoms.    Things that you can do to boost your immune system and help your body get rid of HPV: get plenty of rest, eat a well-balanced diet of healthy foods, stop smoking, exercise regularly and decrease stress.    Please return in 1 year to repeat your pap smear and HPV test.     If you have additional questions regarding this result, please call our registered nurse, Sis at [phone].    Sincerely,      Carmen Ingram MD/[facility]

## 2019-04-08 NOTE — NURSING NOTE
"Chief Complaint   Patient presents with     Physical       Initial /84   Pulse 77   Wt 87.5 kg (193 lb)   LMP 2019   BMI 28.62 kg/m   Estimated body mass index is 28.62 kg/m  as calculated from the following:    Height as of an earlier encounter on 19: 1.749 m (5' 8.86\").    Weight as of this encounter: 87.5 kg (193 lb).  BP completed using cuff size: regular    Questioned patient about current smoking habits.         The following HM Due: pap smear      The following patient reported/Care Every where data was sent to:  P ABSTRACT QUALITY INITIATIVES [[id number]]        N/a      Marcelle Trujillo MA                  "

## 2019-04-10 LAB
COPATH REPORT: NORMAL
PAP: NORMAL

## 2019-04-12 LAB
FINAL DIAGNOSIS: ABNORMAL
HPV HR 12 DNA CVX QL NAA+PROBE: POSITIVE
HPV16 DNA SPEC QL NAA+PROBE: NEGATIVE
HPV18 DNA SPEC QL NAA+PROBE: NEGATIVE
SPECIMEN DESCRIPTION: ABNORMAL
SPECIMEN SOURCE CVX/VAG CYTO: ABNORMAL

## 2019-04-15 PROBLEM — R87.810 CERVICAL HIGH RISK HPV (HUMAN PAPILLOMAVIRUS) TEST POSITIVE: Status: ACTIVE | Noted: 2019-04-08

## 2019-04-15 NOTE — PROGRESS NOTES
06/17/05: [place] pap, + HPV 58.  07/29/05: [place] Bx FREIDA 2-3  09/09/05: LEEP FREIDA 2-3 extending to the margin. Plan [place] and pap in 3 months.  12/13/05: ASC-[facility] pap [place] suggestive for FREIDA I. Plan [place] in 2-3 months.  05/02/06: [place] Neg for dysplasia. Plan pap in 6 months.  11/03/06: NIL pap  07/11/17: NIL pap  03/13/08: NIL pap  11/01/16: NIL pap  04/08/19: NIL pap, + HR HPV (not 16 or 18) result. Plan cotest in 1 year. Tc to mail a result and recommendation letter. CCT Tracking.

## 2019-12-10 NOTE — LETTER
November 6, 2017      Marta Asencio  [address]  [facility] [address]        Dear ,    We are writing to inform you of your test results.    Your test results fall within the expected range(s) or remain unchanged from previous results.  Please continue with current treatment plan.    Resulted Orders   OB hemoglobin   Result Value Ref Range    Hemoglobin 11.6 (L) 11.7 - 15.7 g/dL   Glucose tolerance gest screen 1 hour   Result Value Ref Range    Glu Gest Screen 1hr 50g 75 60 - 129 mg/dL       If you have any questions or concerns, please call the clinic at the number listed above.       Sincerely,        Carmen Ingram MD                
,[email or web address]

## 2020-06-15 NOTE — TELEPHONE ENCOUNTER
"Requested Prescriptions   Pending Prescriptions Disp Refills     citalopram (CELEXA) 20 MG tablet 90 tablet 3     Sig: Take 1 tablet (20 mg) by mouth daily       SSRIs Protocol Failed - 6/11/2020  2:59 PM        Failed - PHQ-9 score less than 5 in past 6 months     Please review last PHQ-9 score.           Failed - Recent (6 mo) or future (30 days) visit within the authorizing provider's specialty     Patient had office visit in the last 6 months or has a visit in the next 30 days with authorizing provider or within the authorizing provider's specialty.  See \"Patient Info\" tab in inbasket, or \"Choose Columns\" in Meds & Orders section of the refill encounter.            Passed - Medication is active on med list        Passed - Patient is age 18 or older        Passed - No active pregnancy on record        Passed - No positive pregnancy test in last 12 months           Last Written Prescription Date:  4/8/19  Last Fill Quantity: 90,   # refills: 3  Last Office Visit: 4/8/19  Future Office visit:       Routing refill request to provider for review/approval because:  Due for appt and phq-9    Bernarda Shultz RN   [facility][note 3]   "

## 2020-06-16 RX ORDER — CITALOPRAM HYDROBROMIDE 20 MG/1
20 TABLET ORAL DAILY
Qty: 90 TABLET | Refills: 3 | Status: SHIPPED | OUTPATIENT
Start: 2020-06-16

## 2020-12-18 NOTE — MR AVS SNAPSHOT
After Visit Summary   9/8/2017    Marta Asencio    MRN: [id number]           Patient Information     Date Of Birth          1985        Visit Information        Provider Department      9/8/2017 9:00 AM Carmen Ingram MD [facility]        Today's Diagnoses     Encounter for supervision of other normal pregnancy in 2nd  trimester    -  1       Follow-ups after your visit        Follow-up notes from your care team     Return in about 4 weeks (around 10/6/2017).      Your next 10 appointments already scheduled     Oct 12, 2017  3:00 PM CDT   US OB > 14 WEEKS COMPLETE SINGLE with RDUS1   [facility] ([facility])    [address] [place] [address]  [facility] [id number] [phone]           Please bring a list of your medicines (including vitamins, minerals and over-the-counter drugs). Also, tell your doctor about any allergies you may have. Wear comfortable clothes and leave your valuables at home.  If you re less than 20 weeks drink four 8-ounce glasses of fluid an hour before your exam. If you need to empty your bladder before your exam, try to release only a little urine. Then, drink another glass of fluid.  You may have up to two family members in the exam room. If you bring a small child, an adult must be there to care for him or her.  Please call the Imaging Department at your exam site with any questions.            Oct 12, 2017  4:00 PM CDT   ESTABLISHED PRENATAL with Carmen Ingram MD   [facility] ([facility])    [address] [place] [id number] [phone]              Future tests that were ordered for you today     Open Future Orders        Priority Expected Expires Ordered    US OB > 14 Weeks Complete Single Routine  9/8/2018 9/8/2017            Who to contact     If you have questions or need follow up information about today's clinic visit or your schedule please  Patient understands there is an increased risk of corneal edema after cataract surgery. contact [facility] directly at [phone].  Normal or non-critical lab and imaging results will be communicated to you by MyChart, letter or phone within 4 business days after the clinic has received the results. If you do not hear from us within 7 days, please contact the clinic through MyChart or phone. If you have a critical or abnormal lab result, we will notify you by phone as soon as possible.  Submit refill requests through [employer] or call your pharmacy and they will forward the refill request to us. Please allow 3 business days for your refill to be completed.          Additional Information About Your Visit        Care EveryWhere ID     This is your Care EveryWhere ID. This could be used by other organizations to access your [facility] medical records  [id number]        Your Vitals Were     Pulse Temperature Last Period BMI (Body Mass Index)          79 97.3  F (36.3  C) (Oral) 05/20/2017 28.32 kg/m2         Blood Pressure from Last 3 Encounters:   09/08/17 101/67   08/22/17 118/74   08/10/17 104/62    Weight from Last 3 Encounters:   09/08/17 189 lb (85.7 kg)   08/22/17 186 lb 4.8 oz (84.5 kg)   08/10/17 186 lb (84.4 kg)              We Performed the Following     Alpha fetoprotein maternal screen        Primary Care Provider Office Phone # Fax #    Carmen Ingram [phone]       [address]  [facility] [address]        Equal Access to Services     BECCA SHEIKH : Hadii naima ku hadasho Soyunali, waaxda luqadaha, qaybta kaalmada danna, loc orona . So [place] -[phone].    ATENCIÓN: Si habla español, tiene a herman disposición servicios gratuitos de asistencia lingüística. Llame al [phone].    We comply with applicable federal civil rights laws and [place] laws. We do not discriminate on the basis of race, color, national origin, age, disability sex, sexual orientation or gender identity.            Thank you!     Thank you  for choosing [facility]  for your care. Our goal is always to provide you with excellent care. Hearing back from our patients is one way we can continue to improve our services. Please take a few minutes to complete the written survey that you may receive in the mail after your visit with us. Thank you!             Your Updated Medication List - Protect others around you: Learn how to safely use, store and throw away your medicines at www.disposemymeds.org.          This list is accurate as of: 9/8/17 12:09 PM.  Always use your most recent med list.                   Brand Name Dispense Instructions for use Diagnosis    doxylamine 25 MG Tabs tablet    UNISOM    28 each    Take 0.5 tablets (12.5 mg) by mouth 3 times daily Take with vitamin B6 for nausea/vomiting    Supervision of normal pregnancy, Nausea and vomiting during pregnancy       Prenatal Adult Gummy/DHA/FA 0.4-25 MG Chew     100 tablet    Take 1 chew tab by mouth daily    Supervision of normal pregnancy in second trimester       * prenatal multivitamin plus iron 27-0.8 MG Tabs per tablet      Take 1 tablet by mouth daily        * prenatal multivitamin plus iron 27-0.8 MG Tabs per tablet     100 tablet    Take 1 tablet by mouth daily    Supervision of normal pregnancy       pyridOXINE 25 MG tablet    vitamin B-6    100 tablet    Take 1 tablet (25 mg) by mouth 3 times daily Take with unisom for nausea/vomiting    Supervision of normal pregnancy, Nausea and vomiting during pregnancy       * Notice:  This list has 2 medication(s) that are the same as other medications prescribed for you. Read the directions carefully, and ask your doctor or other care provider to review them with you.

## 2021-01-28 ENCOUNTER — PATIENT OUTREACH (OUTPATIENT)
Dept: OBGYN | Facility: CLINIC | Age: 36
End: 2021-01-28

## 2021-01-28 DIAGNOSIS — R87.810 CERVICAL HIGH RISK HPV (HUMAN PAPILLOMAVIRUS) TEST POSITIVE: Chronic | ICD-10-CM

## 2021-08-23 RX ORDER — CITALOPRAM HYDROBROMIDE 20 MG/1
TABLET ORAL
Qty: 90 TABLET | Refills: 3 | OUTPATIENT
Start: 2021-08-23

## 2021-08-23 NOTE — TELEPHONE ENCOUNTER
Pt has not been seen at [facility] in >2 years. According to care everywhere patient has established care with [facility]. Called pt and left message requesting she contact PCP for refills.     Thanks,  KATELYN Collins  [facility]

## 2025-08-19 ENCOUNTER — TRANSFERRED RECORDS (OUTPATIENT)
Dept: HEALTH INFORMATION MANAGEMENT | Facility: CLINIC | Age: 40
End: 2025-08-19

## 2025-08-20 ENCOUNTER — TELEPHONE (OUTPATIENT)
Dept: BEHAVIORAL HEALTH | Facility: CLINIC | Age: 40
End: 2025-08-20

## 2025-08-21 ENCOUNTER — TELEPHONE (OUTPATIENT)
Dept: BEHAVIORAL HEALTH | Facility: CLINIC | Age: 40
End: 2025-08-21

## 2025-08-25 ENCOUNTER — TELEPHONE (OUTPATIENT)
Dept: BEHAVIORAL HEALTH | Facility: CLINIC | Age: 40
End: 2025-08-25

## 2025-08-28 ENCOUNTER — TELEPHONE (OUTPATIENT)
Dept: BEHAVIORAL HEALTH | Facility: CLINIC | Age: 40
End: 2025-08-28

## 2025-09-02 ENCOUNTER — TELEPHONE (OUTPATIENT)
Dept: BEHAVIORAL HEALTH | Facility: CLINIC | Age: 40
End: 2025-09-02

## 2025-09-03 ENCOUNTER — TELEPHONE (OUTPATIENT)
Dept: BEHAVIORAL HEALTH | Facility: CLINIC | Age: 40
End: 2025-09-03

## 2025-09-04 ENCOUNTER — TELEPHONE (OUTPATIENT)
Dept: BEHAVIORAL HEALTH | Facility: CLINIC | Age: 40
End: 2025-09-04

## 2025-09-04 ENCOUNTER — HOSPITAL ENCOUNTER (OUTPATIENT)
Dept: BEHAVIORAL HEALTH | Facility: CLINIC | Age: 40
Discharge: HOME | End: 2025-09-04
Attending: FAMILY MEDICINE
Payer: COMMERCIAL

## 2025-09-04 VITALS
SYSTOLIC BLOOD PRESSURE: 135 MMHG | HEIGHT: 69 IN | HEART RATE: 72 BPM | TEMPERATURE: 98.4 F | BODY MASS INDEX: 22.22 KG/M2 | WEIGHT: 150 LBS | OXYGEN SATURATION: 99 % | DIASTOLIC BLOOD PRESSURE: 85 MMHG

## 2025-09-04 DIAGNOSIS — F19.10 SUBSTANCE ABUSE (H): ICD-10-CM

## 2025-09-04 LAB — CREAT UR-MCNC: 122 MG/DL

## 2025-09-04 PROCEDURE — H2035 A/D TX PROGRAM, PER HOUR: HCPCS | Mod: HQ

## 2025-09-04 PROCEDURE — H2035 A/D TX PROGRAM, PER HOUR: HCPCS

## 2025-09-04 PROCEDURE — 1002N00001 HC LODGING PLUS FACILITY CHARGE ADULT

## 2025-09-04 RX ORDER — NALTREXONE HYDROCHLORIDE 50 MG/1
50 TABLET, FILM COATED ORAL DAILY
COMMUNITY
Start: 2025-08-14

## 2025-09-04 ASSESSMENT — ANXIETY QUESTIONNAIRES
7. FEELING AFRAID AS IF SOMETHING AWFUL MIGHT HAPPEN: SEVERAL DAYS
6. BECOMING EASILY ANNOYED OR IRRITABLE: SEVERAL DAYS
GAD7 TOTAL SCORE: 5
5. BEING SO RESTLESS THAT IT IS HARD TO SIT STILL: NOT AT ALL
1. FEELING NERVOUS, ANXIOUS, OR ON EDGE: SEVERAL DAYS
7. FEELING AFRAID AS IF SOMETHING AWFUL MIGHT HAPPEN: SEVERAL DAYS
8. IF YOU CHECKED OFF ANY PROBLEMS, HOW DIFFICULT HAVE THESE MADE IT FOR YOU TO DO YOUR WORK, TAKE CARE OF THINGS AT HOME, OR GET ALONG WITH OTHER PEOPLE?: SOMEWHAT DIFFICULT
IF YOU CHECKED OFF ANY PROBLEMS ON THIS QUESTIONNAIRE, HOW DIFFICULT HAVE THESE PROBLEMS MADE IT FOR YOU TO DO YOUR WORK, TAKE CARE OF THINGS AT HOME, OR GET ALONG WITH OTHER PEOPLE: SOMEWHAT DIFFICULT
GAD7 TOTAL SCORE: 5
GAD7 TOTAL SCORE: 5
3. WORRYING TOO MUCH ABOUT DIFFERENT THINGS: SEVERAL DAYS
4. TROUBLE RELAXING: NOT AT ALL
2. NOT BEING ABLE TO STOP OR CONTROL WORRYING: SEVERAL DAYS

## 2025-09-04 ASSESSMENT — PATIENT HEALTH QUESTIONNAIRE - PHQ9
SUM OF ALL RESPONSES TO PHQ QUESTIONS 1-9: 6
10. IF YOU CHECKED OFF ANY PROBLEMS, HOW DIFFICULT HAVE THESE PROBLEMS MADE IT FOR YOU TO DO YOUR WORK, TAKE CARE OF THINGS AT HOME, OR GET ALONG WITH OTHER PEOPLE: SOMEWHAT DIFFICULT
SUM OF ALL RESPONSES TO PHQ QUESTIONS 1-9: 6

## 2025-09-04 ASSESSMENT — COLUMBIA-SUICIDE SEVERITY RATING SCALE - C-SSRS
6. HAVE YOU EVER DONE ANYTHING, STARTED TO DO ANYTHING, OR PREPARED TO DO ANYTHING TO END YOUR LIFE?: NO
1. IN THE PAST MONTH, HAVE YOU WISHED YOU WERE DEAD OR WISHED YOU COULD GO TO SLEEP AND NOT WAKE UP?: NO
1. IN THE PAST MONTH, HAVE YOU WISHED YOU WERE DEAD OR WISHED YOU COULD GO TO SLEEP AND NOT WAKE UP?: NO
2. HAVE YOU ACTUALLY HAD ANY THOUGHTS OF KILLING YOURSELF LIFETIME?: NO
2. HAVE YOU ACTUALLY HAD ANY THOUGHTS OF KILLING YOURSELF IN THE PAST MONTH?: NO